# Patient Record
Sex: FEMALE | Race: WHITE | NOT HISPANIC OR LATINO | Employment: OTHER | URBAN - METROPOLITAN AREA
[De-identification: names, ages, dates, MRNs, and addresses within clinical notes are randomized per-mention and may not be internally consistent; named-entity substitution may affect disease eponyms.]

---

## 2017-01-13 RX ORDER — FLUOXETINE HYDROCHLORIDE 20 MG/1
40 CAPSULE ORAL DAILY
Qty: 60 CAPSULE | Refills: 3 | Status: SHIPPED | OUTPATIENT
Start: 2017-01-13 | End: 2017-05-14 | Stop reason: SDUPTHER

## 2017-01-16 RX ORDER — VALSARTAN 40 MG/1
TABLET ORAL
Qty: 30 TABLET | Refills: 4 | Status: SHIPPED | OUTPATIENT
Start: 2017-01-16 | End: 2017-06-19 | Stop reason: SDUPTHER

## 2017-01-23 RX ORDER — LOVASTATIN 40 MG/1
TABLET ORAL
Qty: 90 TABLET | Refills: 3 | Status: SHIPPED | OUTPATIENT
Start: 2017-01-23 | End: 2018-01-28 | Stop reason: SDUPTHER

## 2017-01-30 ENCOUNTER — OFFICE VISIT (OUTPATIENT)
Dept: GASTROENTEROLOGY | Facility: CLINIC | Age: 77
End: 2017-01-30

## 2017-01-30 VITALS
HEIGHT: 62 IN | BODY MASS INDEX: 25.54 KG/M2 | WEIGHT: 138.8 LBS | SYSTOLIC BLOOD PRESSURE: 118 MMHG | DIASTOLIC BLOOD PRESSURE: 76 MMHG

## 2017-01-30 DIAGNOSIS — R13.10 PILL DYSPHAGIA: ICD-10-CM

## 2017-01-30 DIAGNOSIS — R10.32 LEFT LOWER QUADRANT PAIN: Primary | ICD-10-CM

## 2017-01-30 DIAGNOSIS — K57.30 DIVERTICULOSIS OF LARGE INTESTINE WITHOUT HEMORRHAGE: ICD-10-CM

## 2017-01-30 DIAGNOSIS — D12.6 TUBULAR ADENOMA OF COLON: ICD-10-CM

## 2017-01-30 PROCEDURE — 99213 OFFICE O/P EST LOW 20 MIN: CPT | Performed by: NURSE PRACTITIONER

## 2017-01-30 NOTE — MR AVS SNAPSHOT
Coby Roland   1/30/2017 1:00 PM   Office Visit    Dept Phone:  [phone]   Encounter #:  [id number]    Provider:  NATHALIA Whitehead   Department:  [facility] GASTROENTEROLOGY                Your Full Care Plan              Your Updated Medication List          This list is accurate as of: 1/30/17  1:17 PM.  Always use your most recent med list.                ALPRAZolam 0.5 MG tablet   Commonly known as:  XANAX       buPROPion  MG 12 hr tablet   Commonly known as:  WELLBUTRIN SR       dicyclomine 20 MG tablet   Commonly known as:  BENTYL   Take 1 tablet by mouth 4 (Four) Times a Day As Needed (llq pain).       FLUoxetine 20 MG capsule   Commonly known as:  PROzac   Take 2 capsules by mouth Daily.       FLUZONE HIGH-DOSE 0.5 ML suspension prefilled syringe injection   Generic drug:  influenza vac split high-dose       lisinopril 10 MG tablet   Commonly known as:  PRINIVIL,ZESTRIL       lovastatin 40 MG tablet   Commonly known as:  MEVACOR   TAKE ONE TABLET BY MOUTH EVERY NIGHT AT BEDTIME       melatonin 3 MG tablet       SUMAtriptan 100 MG tablet   Commonly known as:  IMITREX   TAKE 1 TABLET BY MOUTH AT ONSET OF MIGRAINE HEADACHE. MAY REPEAT IN 2 HOURS IF NEEDED. DO NOT EXCEED 2 TABLETS IN 24 HOURS       valsartan 40 MG tablet   Commonly known as:  DIOVAN   TAKE ONE TABLET BY MOUTH EVERY NIGHT AT BEDTIME               Instructions     None    Patient Instructions History      Upcoming Appointments     Visit Type Date Time Department    FOLLOW UP 1/30/2017  1:00 PM [facility]    [facility] 2/1/2017  9:20 AM [facility]    SUBSEQUENT MEDICARE WELLNESS 2/8/2017  1:00 PM [facility]    FOLLOW UP 5/5/2017  1:00 PM [facility]      [employer] Signup     Our records indicate that you have an active [employer] account.    You can view your After Visit Summary by going to [email or web address] and logging in with your [employer] username  "and password.  If you don't have a [employer] username and password but a parent or guardian has access to your record, the parent or guardian should login with their own [employer] username and password and access your record to view the After Visit Summary.    If you have questions, you can email [email or web address] or call [phone] to talk to our [employer] staff.  Remember, [employer] is NOT to be used for urgent needs.  For medical emergencies, dial 911.               Other Info from Your Visit           Your Appointments     Feb 01, 2017  9:20 AM EST   [facility] with [facility] VANE MOORE   [facility] (--)    [address] [address]   [phone]            Feb 08, 2017  1:00 PM EST   Subsequent Medicare Wellness with Marie Ramos MD   [facility] (--)    [address] [address]   [phone]            May 05, 2017  1:00 PM EDT   Follow Up with NATHALIA Brown   [facility] (--)    [address] [id number] [phone]           Arrive 15 minutes prior to appointment.              Allergies     Penicillins  Swelling      Reason for Visit     Follow-up from CS     Abdominal Pain           Vital Signs     Blood Pressure Height Weight Last Menstrual Period Body Mass Index Smoking Status    118/76 62\" (157.5 cm) 138 lb 12.8 oz (63 kg) (LMP Unknown) 25.39 kg/m2 Never Smoker        "

## 2017-01-30 NOTE — PROGRESS NOTES
Chief Complaint   Patient presents with   • Follow-up     from     • Abdominal Pain     Subjective     HPI  Coby Roland is a 76 y.o. female who presents for a follow up after colonoscopy and to discuss her LLQ abdominal pain.  She was last seen in the office by Dr. Mary Mac for complaints of left lower quadrant abdominal pain.  The episodes were intermittent and severe- she rated her pain 10/10.  Colonoscopy in December 2016 was performed for further evaluation.  He was unrevealing for etiology of her pain however she did have one TA polyp and diverticulosis in the sigmoid colon.  Since her colonoscopy she has no had no further episodes of severe left lower quadrant pain.  She only has very mild occasional (1-2x in 2 months) discomfort rated a 1/2 out of 10 on the pain scale.  She cannot associate the pain/discomfort with eating, fevers, or change in bowel habits.  The mild left lower quadrant pain is easily relieved by Bentyl.  Her bowels are moving regularly and she does not have any complaints of constipation.   Upon questioning of upper GI symptoms, she reports she has trouble swallowing pills occurring 5-7 times per year.  The pills seem to get stuck in her throat for 1-3 minutes before they pass.  She denies trouble swallowing food or liquids.  She denies odynophagia, nausea, vomiting, chest pain, shortness of breath, hematemesis.  She has never had upper endoscopic evaluation.      Past Medical History   Diagnosis Date   • Acute bronchitis    • Arthritis    • Depression    • Hot flashes      with recent cessation of estrogen therapy   • Hyperlipidemia    • Hyperlipidemia    • Hypertension    • Migraine headache      Past Surgical History   Procedure Laterality Date   • Other surgical history       cataract surgery   • Dilation and curettage, diagnostic / therapeutic     • Hysterectomy     • Colonoscopy  2010     Dr. Michaud   • Colonoscopy N/A 12/13/2016     Procedure: COLONOSCOPY to cecum and TI  with cold polypectomy;  Surgeon: Mary Mac MD;  Location: [facility] ENDOSCOPY;  Service:        Social History     Social History   • Marital status:      Spouse name: N/A   • Number of children: N/A   • Years of education: N/A     Social History Main Topics   • Smoking status: Never Smoker   • Smokeless tobacco: Never Used   • Alcohol use No   • Drug use: No   • Sexual activity: Defer     Other Topics Concern   • None     Social History Narrative         Current Outpatient Prescriptions:   •  ALPRAZolam (XANAX) 0.5 MG tablet, Take 1 tablet by mouth nightly., Disp: , Rfl:   •  buPROPion SR (WELLBUTRIN SR) 200 MG 12 hr tablet, Take 1 tablet by mouth daily., Disp: , Rfl:   •  dicyclomine (BENTYL) 20 MG tablet, Take 1 tablet by mouth 4 (Four) Times a Day As Needed (llq pain)., Disp: 60 tablet, Rfl: 3  •  FLUoxetine (PROzac) 20 MG capsule, Take 2 capsules by mouth Daily., Disp: 60 capsule, Rfl: 3  •  FLUZONE HIGH-DOSE 0.5 ML suspension prefilled syringe injection, , Disp: , Rfl:   •  lisinopril (PRINIVIL,ZESTRIL) 10 MG tablet, Take 10 mg by mouth., Disp: , Rfl:   •  lovastatin (MEVACOR) 40 MG tablet, TAKE ONE TABLET BY MOUTH EVERY NIGHT AT BEDTIME, Disp: 90 tablet, Rfl: 3  •  melatonin tablet, Take by mouth. Take as directed, Disp: , Rfl:   •  SUMAtriptan (IMITREX) 100 MG tablet, TAKE 1 TABLET BY MOUTH AT ONSET OF MIGRAINE HEADACHE. MAY REPEAT IN 2 HOURS IF NEEDED. DO NOT EXCEED 2 TABLETS IN 24 HOURS, Disp: 9 tablet, Rfl: 4  •  valsartan (DIOVAN) 40 MG tablet, TAKE ONE TABLET BY MOUTH EVERY NIGHT AT BEDTIME, Disp: 30 tablet, Rfl: 4    Review of Systems   Constitutional: Negative for appetite change and unexpected weight change.   HENT: Positive for trouble swallowing.    Respiratory: Negative for choking and shortness of breath.    Cardiovascular: Negative for chest pain.   Gastrointestinal: Positive for abdominal pain. Negative for abdominal distention, blood in stool, constipation, diarrhea, nausea and  vomiting.   Musculoskeletal: Negative for back pain.   Skin: Negative for color change.   Neurological: Negative for dizziness.   Hematological: Does not bruise/bleed easily.   Psychiatric/Behavioral: Negative.        Objective   Vitals:    01/30/17 1248   BP: 118/76       Physical Exam   Constitutional: She is oriented to person, place, and time. She appears well-developed and well-nourished.   HENT:   Head: Normocephalic and atraumatic.   Eyes: Pupils are equal, round, and reactive to light.   Cardiovascular: Normal rate, regular rhythm and normal heart sounds.    Pulmonary/Chest: Effort normal and breath sounds normal.   Abdominal: Soft. Bowel sounds are normal. She exhibits no shifting dullness, no distension, no pulsatile liver, no fluid wave, no abdominal bruit, no ascites, no pulsatile midline mass and no mass. There is no hepatosplenomegaly. There is no tenderness. There is no rigidity and no guarding. No hernia.   Musculoskeletal: Normal range of motion.   Neurological: She is alert and oriented to person, place, and time.   Skin: Skin is warm and dry.   Psychiatric: She has a normal mood and affect. Her behavior is normal. Thought content normal.   Nursing note and vitals reviewed.      Assessment/Plan   Coby was seen today for follow-up and abdominal pain.    Diagnoses and all orders for this visit:    Left lower quadrant pain  Comments:  Unclear etiology. Bentyl as needed. Possible symptomatic uncomplicated diverticular dx     Diverticulosis of large intestine without hemorrhage  Comments:  Please start a fiber supplement and probiotic    Tubular adenoma of colon  Comments:  consider repeating surevelliance csope in 5 years b/c in good health. Could decline based on age (75yo) and low risk for colon cancer    Pill dysphagia  Comments:  Rare occurence. Pt doesnt was EGD or treatment. Pt agrees to call office if sx persist/ worsen. We discussed risk factors of untreated dysphagia.     Begin a fiber  supplement.  Benefiber, Citrucel or Metamucil is acceptable.  Fiber gummies are also acceptable.  Please take 1 dose of fiber in the morning and 1 in the evening for 4 weeks and increase to 2 doses of fiber in the morning and 2 in the evening after that. Please  try to maintain a high-fiber diet.  We obtain 10 g of fiber from a high-fiber diet every day.  Women should consume a total of 25 grams of fiber a day, men 30 grams a day.  We can reach the total daily recommended dose of fiber a day utilizing the high-fiber diet and also fiber supplements.Please begin a probiotic.  You can try activia yogurt, align, or florastor.  These can be found over-the-counter at a local grocery store.    Call office immediatly if have recurrence of severe LLQ pain. Call if trouble swallowing worsens or becomes more frequent.   For any additional questions, concerns or changes to your condition after today's office visit please contact the office at [phone].

## 2017-02-01 DIAGNOSIS — Z00.00 HEALTH CARE MAINTENANCE: Primary | ICD-10-CM

## 2017-02-01 DIAGNOSIS — R73.09 BLOOD GLUCOSE ABNORMAL: ICD-10-CM

## 2017-02-01 DIAGNOSIS — I10 ESSENTIAL HYPERTENSION: ICD-10-CM

## 2017-02-01 DIAGNOSIS — E78.2 MIXED HYPERLIPIDEMIA: ICD-10-CM

## 2017-02-03 LAB
ALBUMIN SERPL-MCNC: 4 G/DL (ref 3.5–5.2)
ALBUMIN/GLOB SERPL: 1.9 G/DL
ALP SERPL-CCNC: 64 U/L (ref 39–117)
ALT SERPL-CCNC: 20 U/L (ref 1–33)
APPEARANCE UR: CLEAR
AST SERPL-CCNC: 20 U/L (ref 1–32)
BACTERIA #/AREA URNS HPF: NORMAL /HPF
BACTERIA UR CULT: NORMAL
BACTERIA UR CULT: NORMAL
BASOPHILS # BLD AUTO: 0.02 10*3/MM3 (ref 0–0.2)
BASOPHILS NFR BLD AUTO: 0.4 % (ref 0–1.5)
BILIRUB SERPL-MCNC: 0.5 MG/DL (ref 0.1–1.2)
BILIRUB UR QL STRIP: NEGATIVE
BUN SERPL-MCNC: 21 MG/DL (ref 8–23)
BUN/CREAT SERPL: 24.7 (ref 7–25)
CALCIUM SERPL-MCNC: 9.5 MG/DL (ref 8.6–10.5)
CHLORIDE SERPL-SCNC: 103 MMOL/L (ref 98–107)
CHOLEST SERPL-MCNC: 155 MG/DL (ref 0–200)
CO2 SERPL-SCNC: 27.8 MMOL/L (ref 22–29)
COLOR UR: YELLOW
CREAT SERPL-MCNC: 0.85 MG/DL (ref 0.57–1)
EOSINOPHIL # BLD AUTO: 0.28 10*3/MM3 (ref 0–0.7)
EOSINOPHIL NFR BLD AUTO: 6.1 % (ref 0.3–6.2)
EPI CELLS #/AREA URNS HPF: NORMAL /HPF
ERYTHROCYTE [DISTWIDTH] IN BLOOD BY AUTOMATED COUNT: 13.9 % (ref 11.7–13)
GLOBULIN SER CALC-MCNC: 2.1 GM/DL
GLUCOSE SERPL-MCNC: 86 MG/DL (ref 65–99)
GLUCOSE UR QL: NEGATIVE
HBA1C MFR BLD: 4.91 % (ref 4.8–5.6)
HCT VFR BLD AUTO: 40.2 % (ref 35.6–45.5)
HDLC SERPL-MCNC: 67 MG/DL (ref 40–60)
HGB BLD-MCNC: 13 G/DL (ref 11.9–15.5)
HGB UR QL STRIP: NEGATIVE
IMM GRANULOCYTES # BLD: 0 10*3/MM3 (ref 0–0.03)
IMM GRANULOCYTES NFR BLD: 0 % (ref 0–0.5)
KETONES UR QL STRIP: NEGATIVE
LDLC SERPL CALC-MCNC: 79 MG/DL (ref 0–100)
LEUKOCYTE ESTERASE UR QL STRIP: ABNORMAL
LYMPHOCYTES # BLD AUTO: 1.4 10*3/MM3 (ref 0.9–4.8)
LYMPHOCYTES NFR BLD AUTO: 30.3 % (ref 19.6–45.3)
MCH RBC QN AUTO: 31 PG (ref 26.9–32)
MCHC RBC AUTO-ENTMCNC: 32.3 G/DL (ref 32.4–36.3)
MCV RBC AUTO: 95.9 FL (ref 80.5–98.2)
MICRO URNS: ABNORMAL
MONOCYTES # BLD AUTO: 0.42 10*3/MM3 (ref 0.2–1.2)
MONOCYTES NFR BLD AUTO: 9.1 % (ref 5–12)
MUCOUS THREADS URNS QL MICRO: PRESENT /HPF
NEUTROPHILS # BLD AUTO: 2.5 10*3/MM3 (ref 1.9–8.1)
NEUTROPHILS NFR BLD AUTO: 54.1 % (ref 42.7–76)
NITRITE UR QL STRIP: NEGATIVE
PH UR STRIP: 7 [PH] (ref 5–7.5)
PLATELET # BLD AUTO: 225 10*3/MM3 (ref 140–500)
POTASSIUM SERPL-SCNC: 4.8 MMOL/L (ref 3.5–5.2)
PROT SERPL-MCNC: 6.1 G/DL (ref 6–8.5)
PROT UR QL STRIP: NEGATIVE
RBC # BLD AUTO: 4.19 10*6/MM3 (ref 3.9–5.2)
RBC #/AREA URNS HPF: NORMAL /HPF
SODIUM SERPL-SCNC: 142 MMOL/L (ref 136–145)
SP GR UR: 1.02 (ref 1–1.03)
TRIGL SERPL-MCNC: 46 MG/DL (ref 0–150)
TSH SERPL DL<=0.005 MIU/L-ACNC: 1.78 MIU/ML (ref 0.27–4.2)
URINALYSIS REFLEX: ABNORMAL
UROBILINOGEN UR STRIP-MCNC: 1 MG/DL (ref 0.2–1)
VLDLC SERPL CALC-MCNC: 9.2 MG/DL (ref 5–40)
WBC # BLD AUTO: 4.62 10*3/MM3 (ref 4.5–10.7)
WBC #/AREA URNS HPF: NORMAL /HPF

## 2017-02-08 ENCOUNTER — OFFICE VISIT (OUTPATIENT)
Dept: INTERNAL MEDICINE | Facility: CLINIC | Age: 77
End: 2017-02-08

## 2017-02-08 VITALS
HEIGHT: 62 IN | RESPIRATION RATE: 18 BRPM | BODY MASS INDEX: 25.03 KG/M2 | DIASTOLIC BLOOD PRESSURE: 70 MMHG | SYSTOLIC BLOOD PRESSURE: 130 MMHG | OXYGEN SATURATION: 98 % | HEART RATE: 75 BPM | WEIGHT: 136 LBS

## 2017-02-08 DIAGNOSIS — R41.3 MEMORY LOSS OR IMPAIRMENT: ICD-10-CM

## 2017-02-08 DIAGNOSIS — I10 ESSENTIAL HYPERTENSION: ICD-10-CM

## 2017-02-08 DIAGNOSIS — E78.2 MIXED HYPERLIPIDEMIA: ICD-10-CM

## 2017-02-08 DIAGNOSIS — F32.A ANXIETY AND DEPRESSION: ICD-10-CM

## 2017-02-08 DIAGNOSIS — Z13.820 ENCOUNTER FOR SCREENING FOR OSTEOPOROSIS: ICD-10-CM

## 2017-02-08 DIAGNOSIS — F41.9 ANXIETY AND DEPRESSION: ICD-10-CM

## 2017-02-08 DIAGNOSIS — Z00.00 MEDICARE ANNUAL WELLNESS VISIT, SUBSEQUENT: Primary | ICD-10-CM

## 2017-02-08 DIAGNOSIS — R49.0 HOARSENESS: ICD-10-CM

## 2017-02-08 PROCEDURE — 99214 OFFICE O/P EST MOD 30 MIN: CPT | Performed by: INTERNAL MEDICINE

## 2017-02-08 PROCEDURE — G0439 PPPS, SUBSEQ VISIT: HCPCS | Performed by: INTERNAL MEDICINE

## 2017-02-08 RX ORDER — ALPRAZOLAM 0.5 MG/1
0.5 TABLET ORAL NIGHTLY
Qty: 60 TABLET | Refills: 2 | Status: SHIPPED | OUTPATIENT
Start: 2017-02-08 | End: 2017-02-08 | Stop reason: SDUPTHER

## 2017-02-08 RX ORDER — ALPRAZOLAM 0.5 MG/1
0.5 TABLET ORAL 2 TIMES DAILY
Qty: 60 TABLET | Refills: 2 | Status: SHIPPED | OUTPATIENT
Start: 2017-02-08 | End: 2017-08-07 | Stop reason: SDUPTHER

## 2017-02-08 RX ORDER — MELOXICAM 7.5 MG/1
7.5 TABLET ORAL DAILY
COMMUNITY
End: 2018-09-05

## 2017-02-08 NOTE — PROGRESS NOTES
Medicare Annual Wellness Visit    Chief Complaint:  Annual Exam (SUB AWV); Hypertension; Hyperlipidemia; Anxiety; and Depression      History of Present Illness    Coby Roland is a 76 y.o. female who presents for an Annual Wellness Visit. In addition, we addressed the following health issues:    Mammo:6/13/16  Pap: n/a  DEXA:12/9/14  C-scope:12/13/16 Dr. Mac - polyp removed but future c-scope screening was not recommended.    Flu shot:2016  Prevnar: 2015  Pneumovax: 12/29/15  Eye Exam: August 2016  Exercise: She is not really getting much.      Advanced Care Planning:  has NO advanced directive - not interested in additional information   Immunization History   Administered Date(s) Administered   • Influenza, Unspecified 12/09/2013   • Pneumococcal Conjugate 13-Valent 01/01/2015   • Pneumococcal Polysaccharide 12/29/2015   • Tdap 06/16/2014     Anxiety/depression - She takes xanax every morning and at bedtime.  She has been on this for at least ten years.  The pharmacy said they send a refill request yesterday and it was denied.  We have no record of this.  She feels like her symptoms are well controlled.  After further investigation and review of her [facility] report, Dr. Jonback the psychiatrist she has seen for 30 years had been refilling this.  She has not seen her in about a year because every time she had an appt, the office would call and say she was not going to be in the office that day.  She really does not want to go back to that office at this point.  She has been stable on the current regimen of meds for a long time.      She has had some vocal changes.   It's like her voice goes away.  It's gone on for at least a month - maybe two to three months.   She doesn't have a cough.  She has some occasional difficulty swallowing - maybe twice a month.  She did see Dr. Davis a year ago when she was having issues with her voice and the chronic cough.      She has constant discomfort in her left leg.  She  has a torn tendon but they said there is nothing that can be done for it.  She saw Dr. Baron.  She had a hip injection but this didn't help. They sent in Meloxicam 7.5.  She has been using this as needed.  However, she has been using aleve with it when she takes it.   Advised NOT to do this today.      She feels like her memory is not as good as it used to be.  She has forgotten the names of her grandchildren occasionally.  She has to really think about how to get places when she gets in the car.  She hasn't gotten lost.  She hasn't had any issues remembering how to use the microwave or oven or stove.      HTN - no cp or palp or dizziness or soa.          Review of Systems   Constitutional: Negative for chills, fatigue and fever.   HENT: Positive for hearing loss (she has hearing aids), trouble swallowing (occasionally) and voice change. Negative for sore throat and tinnitus.    Eyes: Negative for visual disturbance.   Respiratory: Negative for cough and shortness of breath.    Cardiovascular: Negative for chest pain, palpitations and leg swelling.   Gastrointestinal: Negative for abdominal distention, abdominal pain, anal bleeding, blood in stool, constipation, diarrhea, nausea and vomiting.   Endocrine: Negative for polydipsia and polyuria.   Genitourinary: Negative for dysuria and hematuria.   Musculoskeletal: Positive for arthralgias (left hip). Negative for myalgias.   Skin: Negative for rash and wound.   Neurological: Negative for dizziness, syncope, light-headedness and headaches.   Hematological: Negative for adenopathy. Does not bruise/bleed easily.   Psychiatric/Behavioral: Positive for confusion (memory issues as noted ) and dysphoric mood. The patient is nervous/anxious.        Past Medical History   Diagnosis Date   • Acute bronchitis    • Arthritis    • Depression    • Hot flashes      with recent cessation of estrogen therapy   • Hyperlipidemia    • Hyperlipidemia    • Hypertension    • Migraine  headache        Past Surgical History   Procedure Laterality Date   • Other surgical history       cataract surgery   • Dilation and curettage, diagnostic / therapeutic     • Hysterectomy     • Colonoscopy  2010     Dr. Michaud   • Colonoscopy N/A 12/13/2016     Procedure: COLONOSCOPY to cecum and TI with cold polypectomy;  Surgeon: Mary Mac MD;  Location: [facility] ENDOSCOPY;  Service:        Social History     Social History   • Marital status:      Spouse name: N/A   • Number of children: N/A   • Years of education: N/A     Occupational History   • Not on file.     Social History Main Topics   • Smoking status: Never Smoker   • Smokeless tobacco: Never Used   • Alcohol use No   • Drug use: No   • Sexual activity: Defer     Other Topics Concern   • Not on file     Social History Narrative       Family History   Problem Relation Age of Onset   • Coronary artery disease Mother    • Other Father      cerebrovascular accident (cva)   • Coronary artery disease Father    • Coronary artery disease Brother    • Diabetes Brother      type 2   • Lung cancer Brother    • Ulcerative colitis Brother    • Breast cancer Daughter        Allergies   Allergen Reactions   • Penicillins Swelling       Current Outpatient Prescriptions on File Prior to Visit   Medication Sig Dispense Refill   • buPROPion SR (WELLBUTRIN SR) 200 MG 12 hr tablet Take 1 tablet by mouth 2 (Two) Times a Day.     • dicyclomine (BENTYL) 20 MG tablet Take 1 tablet by mouth 4 (Four) Times a Day As Needed (llq pain). 60 tablet 3   • FLUoxetine (PROzac) 20 MG capsule Take 2 capsules by mouth Daily. 60 capsule 3   • lovastatin (MEVACOR) 40 MG tablet TAKE ONE TABLET BY MOUTH EVERY NIGHT AT BEDTIME 90 tablet 3   • melatonin tablet Take by mouth. Take as directed     • SUMAtriptan (IMITREX) 100 MG tablet TAKE 1 TABLET BY MOUTH AT ONSET OF MIGRAINE HEADACHE. MAY REPEAT IN 2 HOURS IF NEEDED. DO NOT EXCEED 2 TABLETS IN 24 HOURS 9 tablet 4   • valsartan  "(DIOVAN) 40 MG tablet TAKE ONE TABLET BY MOUTH EVERY NIGHT AT BEDTIME 30 tablet 4   • [DISCONTINUED] ALPRAZolam (XANAX) 0.5 MG tablet Take 1 tablet by mouth nightly.     • [DISCONTINUED] FLUZONE HIGH-DOSE 0.5 ML suspension prefilled syringe injection      • [DISCONTINUED] lisinopril (PRINIVIL,ZESTRIL) 10 MG tablet Take 10 mg by mouth.       No current facility-administered medications on file prior to visit.        Patient Active Problem List   Diagnosis   • Abnormal chest x-ray   • Abnormal electrocardiogram   • Blood glucose abnormal   • Dizziness   • Hearing loss   • Hoarseness   • Hyperlipidemia   • Hypertension   • Knee pain   • Onychomycosis of toenail   • Parkinsonian features   • Hyperhidrosis   • Tremor   • Left thigh pain   • Anxiety and depression   • Left groin pain   • Acute left lower quadrant pain   • Memory loss or impairment       Health Risk Assessment/Depression Screen/Functional and Cognitive Screening:   The patient has completed a Health Risk Assessment & Depression screen. These have been reviewed with them and have been scanned as a separate documents.    Age-appropriate Screening Schedule:  Refer to the list below for future screening recommendations based on patient's age. Orders for these recommended tests are listed in the plan section. The patient has been provided with a written plan.     Vitals:    02/08/17 1250   BP: 130/70   Pulse: 75   Resp: 18   SpO2: 98%   Weight: 136 lb (61.7 kg)   Height: 62\" (157.5 cm)       Body mass index is 24.87 kg/(m^2).    Physical Exam   Constitutional: She is oriented to person, place, and time. She appears well-developed and well-nourished. No distress.   HENT:   Head: Normocephalic and atraumatic.   Nose: Nose normal.   Mouth/Throat: Oropharynx is clear and moist.   Eyes: Conjunctivae and EOM are normal. Pupils are equal, round, and reactive to light. No scleral icterus.   Neck: Normal range of motion. Neck supple. No thyromegaly present. "   Cardiovascular: Normal rate, regular rhythm and normal heart sounds.  Exam reveals no gallop and no friction rub.    No murmur heard.  Pulses:       Carotid pulses are 2+ on the right side, and 2+ on the left side.       Femoral pulses are 2+ on the right side, and 2+ on the left side.       Dorsalis pedis pulses are 2+ on the right side, and 2+ on the left side.        Posterior tibial pulses are 2+ on the right side, and 2+ on the left side.   Pulmonary/Chest: Effort normal and breath sounds normal. No respiratory distress. She has no wheezes. She has no rales. Right breast exhibits no mass and no nipple discharge. Left breast exhibits no mass and no nipple discharge.   Abdominal: Soft. Bowel sounds are normal. She exhibits no distension and no mass. There is no tenderness.   Musculoskeletal: Normal range of motion. She exhibits no edema.       Vascular Status -  Her exam exhibits no right foot edema. Her exam exhibits no left foot edema.   Skin Integrity  -  Her right foot skin is intact.     Coby 's left foot skin is intact. .  Lymphadenopathy:     She has no cervical adenopathy.     She has no axillary adenopathy.        Right: No inguinal and no supraclavicular adenopathy present.        Left: No inguinal and no supraclavicular adenopathy present.   Neurological: She is alert and oriented to person, place, and time. She has normal reflexes. No cranial nerve deficit.   Skin: Skin is warm and dry.   Psychiatric: She has a normal mood and affect. Her speech is normal and behavior is normal. Judgment and thought content normal. Cognition and memory are normal.   Vitals reviewed.      Results for orders placed or performed in visit on 02/01/17   Comprehensive Metabolic Panel   Result Value Ref Range    Glucose 86 65 - 99 mg/dL    BUN 21 8 - 23 mg/dL    Creatinine 0.85 0.57 - 1.00 mg/dL    eGFR Non African Am 65 >60 mL/min/1.73    eGFR African Am 79 >60 mL/min/1.73    BUN/Creatinine Ratio 24.7 7.0 - 25.0     Sodium 142 136 - 145 mmol/L    Potassium 4.8 3.5 - 5.2 mmol/L    Chloride 103 98 - 107 mmol/L    Total CO2 27.8 22.0 - 29.0 mmol/L    Calcium 9.5 8.6 - 10.5 mg/dL    Total Protein 6.1 6.0 - 8.5 g/dL    Albumin 4.00 3.50 - 5.20 g/dL    Globulin 2.1 gm/dL    A/G Ratio 1.9 g/dL    Total Bilirubin 0.5 0.1 - 1.2 mg/dL    Alkaline Phosphatase 64 39 - 117 U/L    AST (SGOT) 20 1 - 32 U/L    ALT (SGPT) 20 1 - 33 U/L   Lipid Panel   Result Value Ref Range    Total Cholesterol 155 0 - 200 mg/dL    Triglycerides 46 0 - 150 mg/dL    HDL Cholesterol 67 (H) 40 - 60 mg/dL    VLDL Cholesterol 9.2 5 - 40 mg/dL    LDL Cholesterol  79 0 - 100 mg/dL   TSH Rfx On Abnormal To Free T4   Result Value Ref Range    TSH 1.78 0.27 - 4.2 mIU/mL   UA / M With / Rflx Culture([facility] ONLY)   Result Value Ref Range    Specific Gravity, UA 1.025 1.005 - 1.030    pH, UA 7.0 5.0 - 7.5    Color, UA Yellow Yellow    Appearance, UA Clear Clear    Leukocytes, UA 1+ (A) Negative    Protein Negative Negative/Trace    Glucose, UA Negative Negative    Ketones Negative Negative    Blood, UA Negative Negative    Bilirubin, UA Negative Negative    Urobilinogen, UA 1.0 0.2 - 1.0 mg/dL    Nitrite, UA Negative Negative    Microscopic Examination See below:     Urinalysis Reflex Comment    Hemoglobin A1c   Result Value Ref Range    Hemoglobin A1C 4.91 4.80 - 5.60 %   Microscopic Examination   Result Value Ref Range    WBC, UA 0-5 0 - 5 /hpf    RBC, UA 0-2 0 - 2 /hpf    Epithelial Cells (non renal) 0-10 0 - 10 /hpf    Mucus, UA Present Not Estab. /hpf    Bacteria, UA None seen None seen/Few /hpf   Urine Culture, Routine   Result Value Ref Range    Urine Culture Final report     Result 1 Comment    CBC & Differential   Result Value Ref Range    WBC 4.62 4.50 - 10.70 10*3/mm3    RBC 4.19 3.90 - 5.20 10*6/mm3    Hemoglobin 13.0 11.9 - 15.5 g/dL    Hematocrit 40.2 35.6 - 45.5 %    MCV 95.9 80.5 - 98.2 fL    MCH 31.0 26.9 - 32.0 pg    MCHC 32.3 (L) 32.4 - 36.3 g/dL    RDW  13.9 (H) 11.7 - 13.0 %    Platelets 225 140 - 500 10*3/mm3    Neutrophil Rel % 54.1 42.7 - 76.0 %    Lymphocyte Rel % 30.3 19.6 - 45.3 %    Monocyte Rel % 9.1 5.0 - 12.0 %    Eosinophil Rel % 6.1 0.3 - 6.2 %    Basophil Rel % 0.4 0.0 - 1.5 %    Neutrophils Absolute 2.50 1.90 - 8.10 10*3/mm3    Lymphocytes Absolute 1.40 0.90 - 4.80 10*3/mm3    Monocytes Absolute 0.42 0.20 - 1.20 10*3/mm3    Eosinophils Absolute 0.28 0.00 - 0.70 10*3/mm3    Basophils Absolute 0.02 0.00 - 0.20 10*3/mm3    Immature Granulocyte Rel % 0.0 0.0 - 0.5 %    Immature Grans Absolute 0.00 0.00 - 0.03 10*3/mm3       Assessment & Plan:    Diagnoses and all orders for this visit:    Medicare annual wellness visit, subsequent    Mixed hyperlipidemia    Essential hypertension    Hoarseness  -     esomeprazole (NEXIUM) 20 MG capsule; Take 1 capsule by mouth Every Morning Before Breakfast.    Anxiety and depression    Memory loss or impairment  -     Ambulatory Referral to Neuropsychology    Encounter for screening for osteoporosis  -     DEXA Bone Density Axial    Other orders  -     meloxicam (MOBIC) 7.5 MG tablet; Take 7.5 mg by mouth Daily.  -     Discontinue: ALPRAZolam (XANAX) 0.5 MG tablet; Take 1 tablet by mouth Every Night.  -     ALPRAZolam (XANAX) 0.5 MG tablet; Take 1 tablet by mouth 2 (Two) Times a Day.        Discussion/Summary  Coby is here for her AWV and follow up.  She is almost up to date on her health maintenance.  We were going to do her DEXA today but we don't have an LMR in the office right now.  We will do this in two months when I see her back.  For her hoarseness, I would like her to take Nexium daily to see if this helps.  I am going to see her back in two months to reassess.  Her bp is well controlled.  Her lipids look very good.  She has seen Dr. Davis a year ago for vocal changes and couldn't find anything wrong.  Her symptoms eventually resolved but seem to have returned in the last three months.  If her symptoms do  not improve on Nexium after two months, I would like for him to reassess her vocal cords.  For her Anxiety and depression, she had seen Dr. Duvall for she says 30 years.  She does not feel the need to go back there.  I have agreed to take over her meds.  I would like for her to decrease her Xanax to 0.25 in the morning to see if this helps with memory.  We discussed the effect of benzos at her age.  I am going to see her back in two months to reassess.  Controlled Sub Agreement Signed.  I would like to have her do formal neuropsych testing for her memory.  We will reassess in 2 mo.        Follow Up:  No Follow-up on file.     An After Visit Summary and PPPS with all of these plans were given to the patient.

## 2017-02-21 RX ORDER — BUPROPION HYDROCHLORIDE 200 MG/1
200 TABLET, EXTENDED RELEASE ORAL 2 TIMES DAILY
Qty: 60 TABLET | Refills: 6 | Status: SHIPPED | OUTPATIENT
Start: 2017-02-21 | End: 2017-09-24 | Stop reason: SDUPTHER

## 2017-04-12 ENCOUNTER — OFFICE VISIT (OUTPATIENT)
Dept: INTERNAL MEDICINE | Facility: CLINIC | Age: 77
End: 2017-04-12

## 2017-04-12 VITALS
OXYGEN SATURATION: 99 % | WEIGHT: 133 LBS | HEIGHT: 62 IN | DIASTOLIC BLOOD PRESSURE: 64 MMHG | RESPIRATION RATE: 18 BRPM | SYSTOLIC BLOOD PRESSURE: 122 MMHG | BODY MASS INDEX: 24.48 KG/M2 | HEART RATE: 88 BPM

## 2017-04-12 DIAGNOSIS — F32.A ANXIETY AND DEPRESSION: ICD-10-CM

## 2017-04-12 DIAGNOSIS — R49.0 HOARSENESS: ICD-10-CM

## 2017-04-12 DIAGNOSIS — R07.89 CHEST PAIN, ATYPICAL: ICD-10-CM

## 2017-04-12 DIAGNOSIS — E78.2 MIXED HYPERLIPIDEMIA: ICD-10-CM

## 2017-04-12 DIAGNOSIS — R42 EPISODIC LIGHTHEADEDNESS: Primary | ICD-10-CM

## 2017-04-12 DIAGNOSIS — Z78.0 POSTMENOPAUSAL: ICD-10-CM

## 2017-04-12 DIAGNOSIS — I10 ESSENTIAL HYPERTENSION: ICD-10-CM

## 2017-04-12 DIAGNOSIS — F41.9 ANXIETY AND DEPRESSION: ICD-10-CM

## 2017-04-12 PROCEDURE — 77080 DXA BONE DENSITY AXIAL: CPT | Performed by: INTERNAL MEDICINE

## 2017-04-12 PROCEDURE — 99214 OFFICE O/P EST MOD 30 MIN: CPT | Performed by: INTERNAL MEDICINE

## 2017-04-12 NOTE — PROGRESS NOTES
"Chief Complaint  Coby Roland is a 76 y.o. female who presents for Med Management (Added nexium at last visit, decreased Xanax dosage to help with memory loss. ); Follow-up (DEXA prior. 2 month); and Dizziness  .    History of Present Illness   Coby is here for follow up on her memory and her anxiety and her voice.  She is only taking one Xanax at night.  She was able to get rid of the xanax during the day.  Her memory is definitely better.  She would like to try to decrease or even stop the xanax dose at night as well.  We discussed starting with cutting the pill in half.      Her voice did not improve with the nexium at all.  She still feels like her voice is hoarse.  She has not seen ENT yet.      About two weeks ago she got lightheaded after walking up ten steps.  No SOA.   She had some diarrhea within ten minutes. She hadn't had any belly issues while she was working outside.   She had been working in the yard weeding prior to all of this happening.  The weeds were in a raised bed so she wasn't squatting prior to this happening.  It was a strange lightheadedness.  She hadn't had anything like this before.  Three or four days later she had some electric shock type pain in her chest that came and went but it went on for 15 minutes that woke her up from her sleep.  She hasn't had anything like that since.  She didn't have any other symptoms associated with the \"shocks\".        Review of Systems   HENT: Positive for voice change. Negative for trouble swallowing.    Cardiovascular: Positive for chest pain. Negative for palpitations and leg swelling.   Gastrointestinal: Negative for abdominal pain.   Neurological: Positive for light-headedness. Negative for dizziness, speech difficulty, weakness and headaches.   Psychiatric/Behavioral: Negative for confusion and decreased concentration.       Patient Active Problem List   Diagnosis   • Abnormal chest x-ray   • Abnormal electrocardiogram   • Blood glucose " abnormal   • Dizziness   • Hearing loss   • Hoarseness   • Hyperlipidemia   • Hypertension   • Knee pain   • Onychomycosis of toenail   • Parkinsonian features   • Hyperhidrosis   • Tremor   • Left thigh pain   • Anxiety and depression   • Left groin pain   • Memory loss or impairment   • Episodic lightheadedness       Past Medical History:   Diagnosis Date   • Arthritis    • Depression    • Hot flashes     with recent cessation of estrogen therapy   • Hyperlipidemia    • Hyperlipidemia    • Hypertension    • Migraine headache        Past Surgical History:   Procedure Laterality Date   • COLONOSCOPY  2010    Dr. Michaud   • COLONOSCOPY N/A 12/13/2016    Procedure: COLONOSCOPY to cecum and TI with cold polypectomy;  Surgeon: Mary Mac MD;  Location: [facility] ENDOSCOPY;  Service:    • DILATION AND CURETTAGE, DIAGNOSTIC / THERAPEUTIC     • HYSTERECTOMY     • OTHER SURGICAL HISTORY      cataract surgery       Family History   Problem Relation Age of Onset   • Coronary artery disease Mother    • Other Father      cerebrovascular accident (cva)   • Coronary artery disease Father    • Coronary artery disease Brother    • Diabetes Brother      type 2   • Lung cancer Brother    • Ulcerative colitis Brother    • Breast cancer Daughter        Social History     Social History   • Marital status:      Spouse name: N/A   • Number of children: N/A   • Years of education: N/A     Occupational History   • Not on file.     Social History Main Topics   • Smoking status: Never Smoker   • Smokeless tobacco: Never Used   • Alcohol use No   • Drug use: No   • Sexual activity: Defer     Other Topics Concern   • Not on file     Social History Narrative       Current Outpatient Prescriptions on File Prior to Visit   Medication Sig Dispense Refill   • ALPRAZolam (XANAX) 0.5 MG tablet Take 1 tablet by mouth 2 (Two) Times a Day. 60 tablet 2   • buPROPion SR (WELLBUTRIN SR) 200 MG 12 hr tablet Take 1 tablet by mouth 2 (Two) Times a  "Day. 60 tablet 6   • dicyclomine (BENTYL) 20 MG tablet Take 1 tablet by mouth 4 (Four) Times a Day As Needed (llq pain). 60 tablet 3   • esomeprazole (NEXIUM) 20 MG capsule Take 1 capsule by mouth Every Morning Before Breakfast. 30 capsule    • FLUoxetine (PROzac) 20 MG capsule Take 2 capsules by mouth Daily. 60 capsule 3   • lovastatin (MEVACOR) 40 MG tablet TAKE ONE TABLET BY MOUTH EVERY NIGHT AT BEDTIME 90 tablet 3   • melatonin tablet Take by mouth. Take as directed     • meloxicam (MOBIC) 7.5 MG tablet Take 7.5 mg by mouth Daily.     • SUMAtriptan (IMITREX) 100 MG tablet TAKE 1 TABLET BY MOUTH AT ONSET OF MIGRAINE HEADACHE. MAY REPEAT IN 2 HOURS IF NEEDED. DO NOT EXCEED 2 TABLETS IN 24 HOURS 9 tablet 4   • valsartan (DIOVAN) 40 MG tablet TAKE ONE TABLET BY MOUTH EVERY NIGHT AT BEDTIME 30 tablet 4     No current facility-administered medications on file prior to visit.        Allergies   Allergen Reactions   • Penicillins Swelling       Vitals:    04/12/17 1133   BP: 122/64   Pulse: 88   Resp: 18   SpO2: 99%   Weight: 133 lb (60.3 kg)   Height: 62\" (157.5 cm)       Body mass index is 24.33 kg/(m^2).    Objective   Physical Exam   Constitutional: She is oriented to person, place, and time. She appears well-nourished. No distress.   HENT:   Head: Normocephalic and atraumatic.   Eyes: Conjunctivae are normal. No scleral icterus.   Neck: Normal range of motion. Neck supple.   Cardiovascular: Normal rate, regular rhythm and normal heart sounds.  Exam reveals no gallop and no friction rub.    No murmur heard.  Pulmonary/Chest: Effort normal and breath sounds normal. No respiratory distress. She has no wheezes. She has no rales.   Musculoskeletal: She exhibits no edema.   Lymphadenopathy:     She has no cervical adenopathy.   Neurological: She is alert and oriented to person, place, and time.   Psychiatric: She has a normal mood and affect. Her behavior is normal. Judgment and thought content normal.       Results for " orders placed or performed in visit on 02/01/17   Comprehensive Metabolic Panel   Result Value Ref Range    Glucose 86 65 - 99 mg/dL    BUN 21 8 - 23 mg/dL    Creatinine 0.85 0.57 - 1.00 mg/dL    eGFR Non African Am 65 >60 mL/min/1.73    eGFR African Am 79 >60 mL/min/1.73    BUN/Creatinine Ratio 24.7 7.0 - 25.0    Sodium 142 136 - 145 mmol/L    Potassium 4.8 3.5 - 5.2 mmol/L    Chloride 103 98 - 107 mmol/L    Total CO2 27.8 22.0 - 29.0 mmol/L    Calcium 9.5 8.6 - 10.5 mg/dL    Total Protein 6.1 6.0 - 8.5 g/dL    Albumin 4.00 3.50 - 5.20 g/dL    Globulin 2.1 gm/dL    A/G Ratio 1.9 g/dL    Total Bilirubin 0.5 0.1 - 1.2 mg/dL    Alkaline Phosphatase 64 39 - 117 U/L    AST (SGOT) 20 1 - 32 U/L    ALT (SGPT) 20 1 - 33 U/L   Lipid Panel   Result Value Ref Range    Total Cholesterol 155 0 - 200 mg/dL    Triglycerides 46 0 - 150 mg/dL    HDL Cholesterol 67 (H) 40 - 60 mg/dL    VLDL Cholesterol 9.2 5 - 40 mg/dL    LDL Cholesterol  79 0 - 100 mg/dL   TSH Rfx On Abnormal To Free T4   Result Value Ref Range    TSH 1.78 0.27 - 4.2 mIU/mL   UA / M With / Rflx Culture([facility] ONLY)   Result Value Ref Range    Specific Gravity, UA 1.025 1.005 - 1.030    pH, UA 7.0 5.0 - 7.5    Color, UA Yellow Yellow    Appearance, UA Clear Clear    Leukocytes, UA 1+ (A) Negative    Protein Negative Negative/Trace    Glucose, UA Negative Negative    Ketones Negative Negative    Blood, UA Negative Negative    Bilirubin, UA Negative Negative    Urobilinogen, UA 1.0 0.2 - 1.0 mg/dL    Nitrite, UA Negative Negative    Microscopic Examination See below:     Urinalysis Reflex Comment    Hemoglobin A1c   Result Value Ref Range    Hemoglobin A1C 4.91 4.80 - 5.60 %   Microscopic Examination   Result Value Ref Range    WBC, UA 0-5 0 - 5 /hpf    RBC, UA 0-2 0 - 2 /hpf    Epithelial Cells (non renal) 0-10 0 - 10 /hpf    Mucus, UA Present Not Estab. /hpf    Bacteria, UA None seen None seen/Few /hpf   Urine Culture, Routine   Result Value Ref Range    Urine  Culture Final report     Result 1 Comment    CBC & Differential   Result Value Ref Range    WBC 4.62 4.50 - 10.70 10*3/mm3    RBC 4.19 3.90 - 5.20 10*6/mm3    Hemoglobin 13.0 11.9 - 15.5 g/dL    Hematocrit 40.2 35.6 - 45.5 %    MCV 95.9 80.5 - 98.2 fL    MCH 31.0 26.9 - 32.0 pg    MCHC 32.3 (L) 32.4 - 36.3 g/dL    RDW 13.9 (H) 11.7 - 13.0 %    Platelets 225 140 - 500 10*3/mm3    Neutrophil Rel % 54.1 42.7 - 76.0 %    Lymphocyte Rel % 30.3 19.6 - 45.3 %    Monocyte Rel % 9.1 5.0 - 12.0 %    Eosinophil Rel % 6.1 0.3 - 6.2 %    Basophil Rel % 0.4 0.0 - 1.5 %    Neutrophils Absolute 2.50 1.90 - 8.10 10*3/mm3    Lymphocytes Absolute 1.40 0.90 - 4.80 10*3/mm3    Monocytes Absolute 0.42 0.20 - 1.20 10*3/mm3    Eosinophils Absolute 0.28 0.00 - 0.70 10*3/mm3    Basophils Absolute 0.02 0.00 - 0.20 10*3/mm3    Immature Granulocyte Rel % 0.0 0.0 - 0.5 %    Immature Grans Absolute 0.00 0.00 - 0.03 10*3/mm3       Assessment/Plan   Diagnoses and all orders for this visit:    Episodic lightheadedness  -     Treadmill Stress Test; Future    Postmenopausal  -     DEXA Bone Density Axial    Hoarseness  -     Ambulatory Referral to ENT (Otolaryngology)    Mixed hyperlipidemia  -     Treadmill Stress Test; Future    Essential hypertension  -     Treadmill Stress Test; Future    Anxiety and depression    Chest pain, atypical  -     Treadmill Stress Test; Future        Discussion/Summary  Coby is here for follow up but also has a new issue.  First of all, her hoarseness has not improved on daily nexium.  I have put in a referral to Dr. Davis for further eval.  Her memory has improved since stopping the xanax dose during the day.  She is going to see if she can decrease the dose in the evening to a half a pill.   She is really pleased with the improvement in her memory as am I.  Thankfully, her anxiety has remained well controlled.  She experienced a new lightheadedness and atypical chest pain a few weeks ago that warrants further  evaluation.  I have put in an order for a stress test.  We reviewed her DEXA together which is completely normal.  It is unlikely she will need any further DEXA scans.  Continue current meds as noted.  I will see her back in four months for her follow up.  Will get labs at that time:  CMP  Return in about 4 months (around 8/12/2017) for Next scheduled follow up, with nonfasting labs prior.

## 2017-04-28 ENCOUNTER — OFFICE VISIT (OUTPATIENT)
Dept: ORTHOPEDIC SURGERY | Facility: CLINIC | Age: 77
End: 2017-04-28

## 2017-04-28 VITALS — BODY MASS INDEX: 24.66 KG/M2 | TEMPERATURE: 98 F | HEIGHT: 62 IN | WEIGHT: 134 LBS

## 2017-04-28 DIAGNOSIS — M25.552 LEFT HIP PAIN: Primary | ICD-10-CM

## 2017-04-28 PROCEDURE — 99213 OFFICE O/P EST LOW 20 MIN: CPT | Performed by: NURSE PRACTITIONER

## 2017-04-28 NOTE — PROGRESS NOTES
"Patient: Coby Roland  YOB: 1940 76 y.o. female  Medical Record Number: [id number]    Chief Complaints:   Chief Complaint   Patient presents with   • Left Hip - Follow-up, Pain       History of Present Illness:Coby Roland is a 76 y.o. female who presents For follow-up of left hip pain.  She does have a known labral tear.  She did have a fluoroscopy guided cortisone injection which helped considerably, and in fact is still helping quite a bit.  At this point she does have some mild pain going up and down stairs and feels as though at times her leg\" is a little weak\" but otherwise she feels as though her pain is well-controlled.  She does take Aleve as needed instead of the meloxicam that she was prescribed.    Allergies:   Allergies   Allergen Reactions   • Penicillins Swelling       Medications:   Current Outpatient Prescriptions   Medication Sig Dispense Refill   • ALPRAZolam (XANAX) 0.5 MG tablet Take 1 tablet by mouth 2 (Two) Times a Day. 60 tablet 2   • buPROPion SR (WELLBUTRIN SR) 200 MG 12 hr tablet Take 1 tablet by mouth 2 (Two) Times a Day. 60 tablet 6   • dicyclomine (BENTYL) 20 MG tablet Take 1 tablet by mouth 4 (Four) Times a Day As Needed (llq pain). 60 tablet 3   • FLUoxetine (PROzac) 20 MG capsule Take 2 capsules by mouth Daily. 60 capsule 3   • lovastatin (MEVACOR) 40 MG tablet TAKE ONE TABLET BY MOUTH EVERY NIGHT AT BEDTIME 90 tablet 3   • melatonin tablet Take by mouth. Take as directed     • SUMAtriptan (IMITREX) 100 MG tablet TAKE 1 TABLET BY MOUTH AT ONSET OF MIGRAINE HEADACHE. MAY REPEAT IN 2 HOURS IF NEEDED. DO NOT EXCEED 2 TABLETS IN 24 HOURS 9 tablet 4   • esomeprazole (NEXIUM) 20 MG capsule Take 1 capsule by mouth Every Morning Before Breakfast. 30 capsule    • meloxicam (MOBIC) 7.5 MG tablet Take 7.5 mg by mouth Daily.     • valsartan (DIOVAN) 40 MG tablet TAKE ONE TABLET BY MOUTH EVERY NIGHT AT BEDTIME 30 tablet 4     No current facility-administered medications for " "this visit.          The following portions of the patient's history were reviewed and updated as appropriate: allergies, current medications, past family history, past medical history, past social history, past surgical history and problem list.    Review of Systems:   A 14 point review of systems was performed. All systems negative except pertinent positives/negative listed in HPI above    Physical Exam:   Vitals:    04/28/17 1338   Temp: 98 °F (36.7 °C)   TempSrc: Temporal Artery    Weight: 134 lb (60.8 kg)   Height: 62\" (157.5 cm)       General: A and O x 3, ASA, NAD    SCLERA:    Normal    DENTITION:   NormalSkin clear no unusual lesions noted  Left hip patient is nontender palpation she has normal hip exam with a negative Stinchfield negative logroll calf is soft and nontender    Assessment/Plan:  Left hip pain secondary to labral tear    Patient was given an order for outpatient physical therapy.  Hopefully by doing some quad strengthening and generalized hip stretching but might help some with the weakness that she is experiencing.  She'll continue with Aleve as needed.  I did offer another cortisone injection since it's been several months patient like to hold off.  Otherwise we'll see her back in 6 months she will call the meantime if her symptoms worsen we will repeat x-rays at her follow-up appointment  "

## 2017-05-02 ENCOUNTER — HOSPITAL ENCOUNTER (OUTPATIENT)
Dept: CARDIOLOGY | Facility: HOSPITAL | Age: 77
Discharge: HOME | End: 2017-05-02
Admitting: INTERNAL MEDICINE

## 2017-05-02 DIAGNOSIS — R07.89 CHEST PAIN, ATYPICAL: ICD-10-CM

## 2017-05-02 DIAGNOSIS — E78.2 MIXED HYPERLIPIDEMIA: ICD-10-CM

## 2017-05-02 DIAGNOSIS — R42 EPISODIC LIGHTHEADEDNESS: ICD-10-CM

## 2017-05-02 DIAGNOSIS — I10 ESSENTIAL HYPERTENSION: ICD-10-CM

## 2017-05-02 LAB
BH CV STRESS BP STAGE 1: NORMAL
BH CV STRESS BP STAGE 2: NORMAL
BH CV STRESS BP STAGE 3: NORMAL
BH CV STRESS DURATION MIN STAGE 1: 3
BH CV STRESS DURATION MIN STAGE 2: 3
BH CV STRESS DURATION SEC STAGE 1: 0
BH CV STRESS DURATION SEC STAGE 2: 0
BH CV STRESS DURATION SEC STAGE 3: 42
BH CV STRESS GRADE STAGE 1: 10
BH CV STRESS GRADE STAGE 2: 12
BH CV STRESS GRADE STAGE 3: 14
BH CV STRESS HR STAGE 1: 111
BH CV STRESS HR STAGE 2: 118
BH CV STRESS HR STAGE 3: 123
BH CV STRESS METS STAGE 1: 5
BH CV STRESS METS STAGE 2: 7.5
BH CV STRESS METS STAGE 3: 10
BH CV STRESS PROTOCOL 1: NORMAL
BH CV STRESS RECOVERY BP: NORMAL MMHG
BH CV STRESS RECOVERY HR: 88 BPM
BH CV STRESS SPEED STAGE 1: 1.7
BH CV STRESS SPEED STAGE 2: 2.5
BH CV STRESS SPEED STAGE 3: 3.4
BH CV STRESS STAGE 1: 1
BH CV STRESS STAGE 2: 2
BH CV STRESS STAGE 3: 3
MAXIMAL PREDICTED HEART RATE: 145 BPM
PERCENT MAX PREDICTED HR: 84.83 %
STRESS BASELINE BP: NORMAL MMHG
STRESS BASELINE HR: 84 BPM
STRESS PERCENT HR: 100 %
STRESS POST ESTIMATED WORKLOAD: 8 METS
STRESS POST EXERCISE DUR MIN: 6 MIN
STRESS POST EXERCISE DUR SEC: 42 SEC
STRESS POST PEAK BP: NORMAL MMHG
STRESS POST PEAK HR: 123 BPM
STRESS TARGET HR: 123 BPM

## 2017-05-02 PROCEDURE — 93018 CV STRESS TEST I&R ONLY: CPT | Performed by: INTERNAL MEDICINE

## 2017-05-02 PROCEDURE — 93016 CV STRESS TEST SUPVJ ONLY: CPT | Performed by: INTERNAL MEDICINE

## 2017-05-02 PROCEDURE — 93017 CV STRESS TEST TRACING ONLY: CPT

## 2017-05-05 ENCOUNTER — TREATMENT (OUTPATIENT)
Dept: PHYSICAL THERAPY | Facility: CLINIC | Age: 77
End: 2017-05-05

## 2017-05-05 DIAGNOSIS — M25.552 LEFT HIP PAIN: Primary | ICD-10-CM

## 2017-05-05 PROCEDURE — 97161 PT EVAL LOW COMPLEX 20 MIN: CPT | Performed by: PHYSICAL THERAPIST

## 2017-05-05 PROCEDURE — 97110 THERAPEUTIC EXERCISES: CPT | Performed by: PHYSICAL THERAPIST

## 2017-05-10 ENCOUNTER — TREATMENT (OUTPATIENT)
Dept: PHYSICAL THERAPY | Facility: CLINIC | Age: 77
End: 2017-05-10

## 2017-05-10 DIAGNOSIS — M25.552 LEFT HIP PAIN: Primary | ICD-10-CM

## 2017-05-10 PROCEDURE — 97110 THERAPEUTIC EXERCISES: CPT | Performed by: PHYSICAL THERAPIST

## 2017-05-10 PROCEDURE — 97140 MANUAL THERAPY 1/> REGIONS: CPT | Performed by: PHYSICAL THERAPIST

## 2017-05-15 RX ORDER — FLUOXETINE HYDROCHLORIDE 20 MG/1
CAPSULE ORAL
Qty: 60 CAPSULE | Refills: 2 | Status: SHIPPED | OUTPATIENT
Start: 2017-05-15 | End: 2017-08-21 | Stop reason: SDUPTHER

## 2017-05-17 ENCOUNTER — TREATMENT (OUTPATIENT)
Dept: PHYSICAL THERAPY | Facility: CLINIC | Age: 77
End: 2017-05-17

## 2017-05-17 DIAGNOSIS — M25.552 LEFT HIP PAIN: Primary | ICD-10-CM

## 2017-05-17 PROCEDURE — 97140 MANUAL THERAPY 1/> REGIONS: CPT | Performed by: PHYSICAL THERAPIST

## 2017-05-17 PROCEDURE — 97110 THERAPEUTIC EXERCISES: CPT | Performed by: PHYSICAL THERAPIST

## 2017-05-24 ENCOUNTER — TREATMENT (OUTPATIENT)
Dept: PHYSICAL THERAPY | Facility: CLINIC | Age: 77
End: 2017-05-24

## 2017-05-24 DIAGNOSIS — M25.552 LEFT HIP PAIN: Primary | ICD-10-CM

## 2017-05-24 PROCEDURE — 97112 NEUROMUSCULAR REEDUCATION: CPT | Performed by: PHYSICAL THERAPIST

## 2017-05-24 PROCEDURE — 97110 THERAPEUTIC EXERCISES: CPT | Performed by: PHYSICAL THERAPIST

## 2017-06-05 ENCOUNTER — OFFICE VISIT (OUTPATIENT)
Dept: INTERNAL MEDICINE | Facility: CLINIC | Age: 77
End: 2017-06-05

## 2017-06-05 ENCOUNTER — TREATMENT (OUTPATIENT)
Dept: PHYSICAL THERAPY | Facility: CLINIC | Age: 77
End: 2017-06-05

## 2017-06-05 VITALS
BODY MASS INDEX: 23.92 KG/M2 | HEIGHT: 62 IN | RESPIRATION RATE: 18 BRPM | OXYGEN SATURATION: 98 % | HEART RATE: 74 BPM | SYSTOLIC BLOOD PRESSURE: 118 MMHG | DIASTOLIC BLOOD PRESSURE: 62 MMHG | WEIGHT: 130 LBS

## 2017-06-05 DIAGNOSIS — G56.03 BILATERAL CARPAL TUNNEL SYNDROME: Primary | ICD-10-CM

## 2017-06-05 DIAGNOSIS — M25.552 LEFT HIP PAIN: Primary | ICD-10-CM

## 2017-06-05 PROCEDURE — 97112 NEUROMUSCULAR REEDUCATION: CPT | Performed by: PHYSICAL THERAPIST

## 2017-06-05 PROCEDURE — 99213 OFFICE O/P EST LOW 20 MIN: CPT | Performed by: INTERNAL MEDICINE

## 2017-06-05 PROCEDURE — 97110 THERAPEUTIC EXERCISES: CPT | Performed by: PHYSICAL THERAPIST

## 2017-06-05 NOTE — PROGRESS NOTES
Physical Therapy Daily Progress Note  Visit: 5    Coby Roland reports: My hip is feeling better, but my hands are going numb. I am going to MD today about my hands    Subjective     Objective   See Exercise, Manual, and Modality Logs for complete treatment.       Assessment & Plan     Assessment  Assessment details: Pt tolerated treatment well. Definitely improving with balance and left hip strength    Plan  Plan details: Progress per POC        Manual Therapy:    2     mins  61087;  Therapeutic Exercise:    30     mins  03335;     Neuromuscular Yrn:    10    mins  46674;    Therapeutic Activity:     -     mins  62729;     Gait Training:      -     mins  78247;     Ultrasound:     -     mins  48499;    Electrical Stimulation:    -     mins  73183 ( );  Dry Needling     -     mins self-pay    Timed Treatment:   42   mins   Total Treatment:     50   mins    Soniya Sands PT  [place] License #: [id number]    Physical Therapist

## 2017-06-05 NOTE — PATIENT INSTRUCTIONS
Carpal Tunnel Syndrome  Carpal tunnel syndrome is a condition that causes pain in your hand and arm. The carpal tunnel is a narrow area located on the palm side of your wrist. Repeated wrist motion or certain diseases may cause swelling within the tunnel. This swelling pinches the main nerve in the wrist (median nerve).  CAUSES   This condition may be caused by:   · Repeated wrist motions.  · Wrist injuries.  · Arthritis.  · A cyst or tumor in the carpal tunnel.  · Fluid buildup during pregnancy.  Sometimes the cause of this condition is not known.   RISK FACTORS  This condition is more likely to develop in:   · People who have jobs that cause them to repeatedly move their wrists in the same motion, such as butchers and cashiers.  · Women.  · People with certain conditions, such as:    Diabetes.    Obesity.    An underactive thyroid (hypothyroidism).    Kidney failure.  SYMPTOMS   Symptoms of this condition include:   · A tingling feeling in your fingers, especially in your thumb, index, and middle fingers.  · Tingling or numbness in your hand.  · An aching feeling in your entire arm, especially when your wrist and elbow are bent for long periods of time.  · Wrist pain that goes up your arm to your shoulder.  · Pain that goes down into your palm or fingers.  · A weak feeling in your hands. You may have trouble grabbing and holding items.  Your symptoms may feel worse during the night.   DIAGNOSIS   This condition is diagnosed with a medical history and physical exam. You may also have tests, including:   · An electromyogram (EMG). This test measures electrical signals sent by your nerves into the muscles.  · X-rays.  TREATMENT   Treatment for this condition includes:  · Lifestyle changes. It is important to stop doing or modify the activity that caused your condition.  · Physical or occupational therapy.  · Medicines for pain and inflammation. This may include medicine that is injected into your wrist.  · A wrist  splint.  · Surgery.  HOME CARE INSTRUCTIONS   If You Have a Splint:  · Wear it as told by your health care provider. Remove it only as told by your health care provider.  · Loosen the splint if your fingers become numb and tingle, or if they turn cold and blue.  · Keep the splint clean and dry.  General Instructions  · Take over-the-counter and prescription medicines only as told by your health care provider.  · Rest your wrist from any activity that may be causing your pain. If your condition is work related, talk to your employer about changes that can be made, such as getting a wrist pad to use while typing.  · If directed, apply ice to the painful area:    Put ice in a plastic bag.    Place a towel between your skin and the bag.    Leave the ice on for 20 minutes, 2-3 times per day.  · Keep all follow-up visits as told by your health care provider. This is important.  · Do any exercises as told by your health care provider, physical therapist, or occupational therapist.  SEEK MEDICAL CARE IF:   · You have new symptoms.  · Your pain is not controlled with medicines.  · Your symptoms get worse.     This information is not intended to replace advice given to you by your health care provider. Make sure you discuss any questions you have with your health care provider.     Document Released: 12/15/2001 Document Revised: 04/10/2017 Document Reviewed: 05/04/2016  [employer] Interactive Patient Education ©2017 [employer]

## 2017-06-05 NOTE — PROGRESS NOTES
Chief Complaint  Coby Roland is a 76 y.o. female who presents for Numbness (Both hands, for about a week and a half, was mostly the right hand. Woke up this morning with her left hand numb. Right hand has went completely numb twice today. Thinks that during the spring she carries big bags of mulch of soil 40-50 lbs. )  .    History of Present Illness   Her hands have started to go numb when she uses them.  After a while, the sensation comes back.  It's worse in her right hand.  She is right handed.  She had been coloring a lot in the new coloring books.  This doesn't seem to provoke it too much, though.  Her hand gets tingly.  She hasn't tried taking any ibuprofen.  She actually has wrist splints at home but has not been wearing them.         Review of Systems   Constitutional: Negative for diaphoresis, fatigue and fever.   Respiratory: Negative for shortness of breath.    Cardiovascular: Negative for chest pain and palpitations.   Gastrointestinal: Negative for abdominal pain, nausea and vomiting.   Musculoskeletal: Negative for back pain and neck pain.   Neurological: Positive for numbness. Negative for dizziness, syncope, weakness, light-headedness and headaches.   Psychiatric/Behavioral: Negative for confusion.       Patient Active Problem List   Diagnosis   • Abnormal chest x-ray   • Abnormal electrocardiogram   • Blood glucose abnormal   • Dizziness   • Hearing loss   • Hoarseness   • Hyperlipidemia   • Hypertension   • Knee pain   • Onychomycosis of toenail   • Parkinsonian features   • Hyperhidrosis   • Tremor   • Left thigh pain   • Anxiety and depression   • Left groin pain   • Memory loss or impairment   • Episodic lightheadedness       Past Medical History:   Diagnosis Date   • Allergic     Penicillin    • Arthritis    • Cataract    • Depression    • Hot flashes     with recent cessation of estrogen therapy   • Hyperlipidemia    • Hyperlipidemia    • Hypertension    • Migraine headache        Past  Surgical History:   Procedure Laterality Date   • COLONOSCOPY  2010    Dr. Michaud   • COLONOSCOPY N/A 12/13/2016    Procedure: COLONOSCOPY to cecum and TI with cold polypectomy;  Surgeon: Mary Mac MD;  Location: [facility] ENDOSCOPY;  Service:    • DILATION AND CURETTAGE, DIAGNOSTIC / THERAPEUTIC     • HYSTERECTOMY     • OTHER SURGICAL HISTORY      cataract surgery       Family History   Problem Relation Age of Onset   • Coronary artery disease Mother    • Other Father      cerebrovascular accident (cva)   • Coronary artery disease Father    • Coronary artery disease Brother    • Diabetes Brother      type 2   • Lung cancer Brother    • Ulcerative colitis Brother    • Breast cancer Daughter        Social History     Social History   • Marital status:      Spouse name: N/A   • Number of children: N/A   • Years of education: N/A     Occupational History   • Not on file.     Social History Main Topics   • Smoking status: Never Smoker   • Smokeless tobacco: Never Used   • Alcohol use No   • Drug use: No   • Sexual activity: Defer     Other Topics Concern   • Not on file     Social History Narrative       Current Outpatient Prescriptions on File Prior to Visit   Medication Sig Dispense Refill   • ALPRAZolam (XANAX) 0.5 MG tablet Take 1 tablet by mouth 2 (Two) Times a Day. (Patient taking differently: Take 0.5 mg by mouth At Night As Needed.) 60 tablet 2   • buPROPion SR (WELLBUTRIN SR) 200 MG 12 hr tablet Take 1 tablet by mouth 2 (Two) Times a Day. 60 tablet 6   • esomeprazole (NEXIUM) 20 MG capsule Take 1 capsule by mouth Every Morning Before Breakfast. 30 capsule    • FLUoxetine (PROzac) 20 MG capsule TAKE TWO CAPSULES BY MOUTH DAILY 60 capsule 2   • lovastatin (MEVACOR) 40 MG tablet TAKE ONE TABLET BY MOUTH EVERY NIGHT AT BEDTIME 90 tablet 3   • melatonin tablet Take by mouth. Take as directed     • SUMAtriptan (IMITREX) 100 MG tablet TAKE 1 TABLET BY MOUTH AT ONSET OF MIGRAINE HEADACHE. MAY REPEAT IN 2  "HOURS IF NEEDED. DO NOT EXCEED 2 TABLETS IN 24 HOURS 9 tablet 4   • valsartan (DIOVAN) 40 MG tablet TAKE ONE TABLET BY MOUTH EVERY NIGHT AT BEDTIME 30 tablet 4   • dicyclomine (BENTYL) 20 MG tablet Take 1 tablet by mouth 4 (Four) Times a Day As Needed (llq pain). 60 tablet 3   • meloxicam (MOBIC) 7.5 MG tablet Take 7.5 mg by mouth Daily.       No current facility-administered medications on file prior to visit.        Allergies   Allergen Reactions   • Penicillins Swelling       Vitals:    06/05/17 1413   BP: 118/62   Pulse: 74   Resp: 18   SpO2: 98%   Weight: 130 lb (59 kg)   Height: 62\" (157.5 cm)       Body mass index is 23.78 kg/(m^2).    Objective   Physical Exam   Constitutional: She is oriented to person, place, and time. She appears well-developed and well-nourished. No distress.   HENT:   Head: Normocephalic and atraumatic.   Musculoskeletal:        Right wrist: She exhibits normal range of motion and no tenderness.        Left wrist: She exhibits normal range of motion and no tenderness.   + phalen test bilat.  Tingling developed within one minute.   Neurological: She is alert and oriented to person, place, and time. She has normal strength. No cranial nerve deficit.   Psychiatric: She has a normal mood and affect. Her behavior is normal. Judgment and thought content normal.       Assessment/Plan   Diagnoses and all orders for this visit:    Bilateral carpal tunnel syndrome        Discussion/Summary  Coby is here for acute care for a new issue.  She has CTS bilaterally.  Advised to use meloxicam that she has daily and to use the wrist splints that she was given in the past.  She has been doing a lot of coloring and yard work.  Advised to hold off on these kind of repetitive motion activities until symptoms improve.  If she has to do them, wear the wrist splints.  Add B complex vitamin.  If symptoms do not improve with conservative therapy, advised to call and will refer to Hand specialist.    No " Follow-up on file.

## 2017-06-14 ENCOUNTER — TREATMENT (OUTPATIENT)
Dept: PHYSICAL THERAPY | Facility: CLINIC | Age: 77
End: 2017-06-14

## 2017-06-14 DIAGNOSIS — M25.552 LEFT HIP PAIN: Primary | ICD-10-CM

## 2017-06-14 PROCEDURE — 97110 THERAPEUTIC EXERCISES: CPT | Performed by: PHYSICAL THERAPIST

## 2017-06-14 NOTE — PROGRESS NOTES
Physical Therapy Discharge  Visit: 6    Coby Roland reports: I am feeling really really good. Going up the steps with both legs now and having minimal-no pain. Pain reported as 0/10    Subjective     Objective     Strength/Myotome Testing     Left Hip   Planes of Motion   Flexion: 4+  Extension: 5  Abduction: 5  External rotation: 5  Internal rotation: 5    Right Hip   Planes of Motion   Flexion: 5  Extension: 5  Abduction: 5  External rotation: 5  Internal rotation: 5    Ambulation     Ambulation: Stairs   Pattern: reciprocal  Pattern: reciprocal     See Exercise, Manual, and Modality Logs for complete treatment.       Assessment & Plan     Assessment  Assessment details: Pt did excellent with therapy. Pt has met most all goals and will be DC'd at this time    Plan  Plan details: Dc at this time        Patient goal: SHORT TERM GOALS: 3 weeks  1. Patient to be compliant with HEP and demo good efficiency with TE (MET)  2. Report < 2 sleep disturbances 2° hip pain. (MET)  3. Increased Lumbar and SIJ mobility to allow for improved pelvic alignment and gait mechanics (equal step length and level pelvis throughout gait). (MET)  4. Increased hip ER/IR ROM to WFL (IR to 30°) degrees to allow for increased ease with bed mobility and squatting. (MET)  5. Pt will report minimal-no tenderness to palpation with firm pressure. (MET)  6. Pt. Able to ambulate up to 60 min with pain < 1/10 with acceptable pattern. (MET)    LONG TERM GOALS: 2 months  1. Pt. to score > 70/80 perceived ability on LEFS (NOT MET)  2. Pain level < 0/10 in hips with all activities including sitting > 2 hr continuously. (MET)  3. Hip AROM to WNL to allow for return to ADL’s/IADLS and functional activities without increased symptoms (MET)  4. Hip strength to 4+/5 to allow for pushing, pulling and more strenuous activities to occur without pain. (MET)  5. No palpable tenderness to the hip. (MET)    Manual Therapy:    -     mins  27796;  Therapeutic  Exercise:    34     mins  32457;     Neuromuscular Yrn:    -    mins  12516;    Therapeutic Activity:     -     mins  02351;     Gait Training:      -     mins  91893;     Ultrasound:     -     mins  20960;    Electrical Stimulation:    -     mins  92399 ( );  Dry Needling     -     mins self-pay    Timed Treatment:   34   mins   Total Treatment:     50   mins    JENNY Corley License #: [id number]    Physical Therapist

## 2017-06-19 RX ORDER — VALSARTAN 40 MG/1
TABLET ORAL
Qty: 30 TABLET | Refills: 3 | Status: SHIPPED | OUTPATIENT
Start: 2017-06-19 | End: 2017-10-10 | Stop reason: SDUPTHER

## 2017-07-06 ENCOUNTER — APPOINTMENT (OUTPATIENT)
Dept: CT IMAGING | Facility: HOSPITAL | Age: 77
End: 2017-07-06

## 2017-07-06 ENCOUNTER — HOSPITAL ENCOUNTER (EMERGENCY)
Facility: HOSPITAL | Age: 77
Discharge: HOME | End: 2017-07-06
Attending: FAMILY MEDICINE | Admitting: FAMILY MEDICINE

## 2017-07-06 VITALS
BODY MASS INDEX: 22.08 KG/M2 | DIASTOLIC BLOOD PRESSURE: 73 MMHG | HEIGHT: 62 IN | RESPIRATION RATE: 16 BRPM | SYSTOLIC BLOOD PRESSURE: 148 MMHG | HEART RATE: 76 BPM | TEMPERATURE: 99.4 F | OXYGEN SATURATION: 97 % | WEIGHT: 120 LBS

## 2017-07-06 DIAGNOSIS — S29.019A THORACIC MYOFASCIAL STRAIN, INITIAL ENCOUNTER: Primary | ICD-10-CM

## 2017-07-06 LAB
ALBUMIN SERPL-MCNC: 3.9 G/DL (ref 3.5–5.2)
ALBUMIN/GLOB SERPL: 1.6 G/DL
ALP SERPL-CCNC: 63 U/L (ref 39–117)
ALT SERPL W P-5'-P-CCNC: 18 U/L (ref 1–33)
ANION GAP SERPL CALCULATED.3IONS-SCNC: 13.5 MMOL/L
AST SERPL-CCNC: 21 U/L (ref 1–32)
BACTERIA UR QL AUTO: ABNORMAL /HPF
BASOPHILS # BLD AUTO: 0.02 10*3/MM3 (ref 0–0.2)
BASOPHILS NFR BLD AUTO: 0.4 % (ref 0–1.5)
BILIRUB SERPL-MCNC: 0.4 MG/DL (ref 0.1–1.2)
BILIRUB UR QL STRIP: NEGATIVE
BUN BLD-MCNC: 22 MG/DL (ref 8–23)
BUN/CREAT SERPL: 23.9 (ref 7–25)
CALCIUM SPEC-SCNC: 9.2 MG/DL (ref 8.6–10.5)
CHLORIDE SERPL-SCNC: 100 MMOL/L (ref 98–107)
CLARITY UR: CLEAR
CO2 SERPL-SCNC: 24.5 MMOL/L (ref 22–29)
COLOR UR: YELLOW
CREAT BLD-MCNC: 0.92 MG/DL (ref 0.57–1)
DEPRECATED RDW RBC AUTO: 45.3 FL (ref 37–54)
EOSINOPHIL # BLD AUTO: 0.29 10*3/MM3 (ref 0–0.7)
EOSINOPHIL NFR BLD AUTO: 5.2 % (ref 0.3–6.2)
ERYTHROCYTE [DISTWIDTH] IN BLOOD BY AUTOMATED COUNT: 13.6 % (ref 11.7–13)
GFR SERPL CREATININE-BSD FRML MDRD: 60 ML/MIN/1.73
GLOBULIN UR ELPH-MCNC: 2.5 GM/DL
GLUCOSE BLD-MCNC: 106 MG/DL (ref 65–99)
GLUCOSE UR STRIP-MCNC: NEGATIVE MG/DL
HCT VFR BLD AUTO: 36.6 % (ref 35.6–45.5)
HGB BLD-MCNC: 12.5 G/DL (ref 11.9–15.5)
HGB UR QL STRIP.AUTO: NEGATIVE
HOLD SPECIMEN: NORMAL
HOLD SPECIMEN: NORMAL
HYALINE CASTS UR QL AUTO: ABNORMAL /LPF
IMM GRANULOCYTES # BLD: 0 10*3/MM3 (ref 0–0.03)
IMM GRANULOCYTES NFR BLD: 0 % (ref 0–0.5)
KETONES UR QL STRIP: NEGATIVE
LEUKOCYTE ESTERASE UR QL STRIP.AUTO: ABNORMAL
LIPASE SERPL-CCNC: 26 U/L (ref 13–60)
LYMPHOCYTES # BLD AUTO: 1.94 10*3/MM3 (ref 0.9–4.8)
LYMPHOCYTES NFR BLD AUTO: 35.1 % (ref 19.6–45.3)
MCH RBC QN AUTO: 31 PG (ref 26.9–32)
MCHC RBC AUTO-ENTMCNC: 34.2 G/DL (ref 32.4–36.3)
MCV RBC AUTO: 90.8 FL (ref 80.5–98.2)
MONOCYTES # BLD AUTO: 0.48 10*3/MM3 (ref 0.2–1.2)
MONOCYTES NFR BLD AUTO: 8.7 % (ref 5–12)
NEUTROPHILS # BLD AUTO: 2.8 10*3/MM3 (ref 1.9–8.1)
NEUTROPHILS NFR BLD AUTO: 50.6 % (ref 42.7–76)
NITRITE UR QL STRIP: NEGATIVE
PH UR STRIP.AUTO: 6.5 [PH] (ref 5–8)
PLATELET # BLD AUTO: 236 10*3/MM3 (ref 140–500)
PMV BLD AUTO: 10.3 FL (ref 6–12)
POTASSIUM BLD-SCNC: 4 MMOL/L (ref 3.5–5.2)
PROT SERPL-MCNC: 6.4 G/DL (ref 6–8.5)
PROT UR QL STRIP: NEGATIVE
RBC # BLD AUTO: 4.03 10*6/MM3 (ref 3.9–5.2)
RBC # UR: ABNORMAL /HPF
REF LAB TEST METHOD: ABNORMAL
SODIUM BLD-SCNC: 138 MMOL/L (ref 136–145)
SP GR UR STRIP: 1.01 (ref 1–1.03)
SQUAMOUS #/AREA URNS HPF: ABNORMAL /HPF
UROBILINOGEN UR QL STRIP: ABNORMAL
WBC NRBC COR # BLD: 5.53 10*3/MM3 (ref 4.5–10.7)
WBC UR QL AUTO: ABNORMAL /HPF
WHOLE BLOOD HOLD SPECIMEN: NORMAL
WHOLE BLOOD HOLD SPECIMEN: NORMAL

## 2017-07-06 PROCEDURE — 81001 URINALYSIS AUTO W/SCOPE: CPT | Performed by: FAMILY MEDICINE

## 2017-07-06 PROCEDURE — 83690 ASSAY OF LIPASE: CPT | Performed by: FAMILY MEDICINE

## 2017-07-06 PROCEDURE — 96374 THER/PROPH/DIAG INJ IV PUSH: CPT

## 2017-07-06 PROCEDURE — 99283 EMERGENCY DEPT VISIT LOW MDM: CPT

## 2017-07-06 PROCEDURE — 87086 URINE CULTURE/COLONY COUNT: CPT | Performed by: FAMILY MEDICINE

## 2017-07-06 PROCEDURE — 96375 TX/PRO/DX INJ NEW DRUG ADDON: CPT

## 2017-07-06 PROCEDURE — 85025 COMPLETE CBC W/AUTO DIFF WBC: CPT | Performed by: FAMILY MEDICINE

## 2017-07-06 PROCEDURE — 25010000002 ONDANSETRON PER 1 MG: Performed by: FAMILY MEDICINE

## 2017-07-06 PROCEDURE — 25010000002 HYDROMORPHONE PER 4 MG: Performed by: FAMILY MEDICINE

## 2017-07-06 PROCEDURE — 80053 COMPREHEN METABOLIC PANEL: CPT | Performed by: FAMILY MEDICINE

## 2017-07-06 PROCEDURE — 74176 CT ABD & PELVIS W/O CONTRAST: CPT

## 2017-07-06 RX ORDER — HYDROMORPHONE HYDROCHLORIDE 1 MG/ML
0.5 INJECTION, SOLUTION INTRAMUSCULAR; INTRAVENOUS; SUBCUTANEOUS ONCE
Status: COMPLETED | OUTPATIENT
Start: 2017-07-06 | End: 2017-07-06

## 2017-07-06 RX ORDER — SODIUM CHLORIDE 0.9 % (FLUSH) 0.9 %
10 SYRINGE (ML) INJECTION AS NEEDED
Status: DISCONTINUED | OUTPATIENT
Start: 2017-07-06 | End: 2017-07-06 | Stop reason: HOSPADM

## 2017-07-06 RX ORDER — ONDANSETRON 2 MG/ML
4 INJECTION INTRAMUSCULAR; INTRAVENOUS ONCE
Status: COMPLETED | OUTPATIENT
Start: 2017-07-06 | End: 2017-07-06

## 2017-07-06 RX ORDER — HYDROCODONE BITARTRATE AND ACETAMINOPHEN 5; 325 MG/1; MG/1
1 TABLET ORAL 4 TIMES DAILY PRN
Qty: 15 TABLET | Refills: 0 | Status: SHIPPED | OUTPATIENT
Start: 2017-07-06 | End: 2017-08-16

## 2017-07-06 RX ADMIN — HYDROMORPHONE HYDROCHLORIDE 0.5 MG: 1 INJECTION, SOLUTION INTRAMUSCULAR; INTRAVENOUS; SUBCUTANEOUS at 18:06

## 2017-07-06 RX ADMIN — ONDANSETRON 4 MG: 2 INJECTION INTRAMUSCULAR; INTRAVENOUS at 18:06

## 2017-07-06 NOTE — ED PROVIDER NOTES
" EMERGENCY DEPARTMENT ENCOUNTER    CHIEF COMPLAINT  Chief Complaint: Flank pain  History given by: Pt  History limited by: Nothing  Room Number: 23/23  PMD: Marie Ramos MD      HPI:  Pt is a 76 y.o. female who presents complaining of L-sided flank pain onset 1300. Pt denies nausea, SOA, CP, or any other symptoms at this time. She states her pain is exacerbated when she leans to the L. Pt has a hx of nephrolithiasis.    Duration:  1300  Onset: gradual  Timing: constant  Location: L-sided  Radiation: none  Quality: \"pain\"  Intensity/Severity: moderate  Progression: unchanged  Associated Symptoms: none  Aggravating Factors: leaning to the L  Alleviating Factors: none  Previous Episodes: Pt has a hx of kidney stones.  Treatment before arrival: Pt received no treatment PTA.    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   • Tremor 02/01/2016   • Left thigh pain 02/01/2016   • Anxiety and depression 08/08/2016   • Left groin pain 08/08/2016   • Memory loss or impairment 02/08/2017   • Episodic lightheadedness 04/12/2017     Resolved Ambulatory Problems     Diagnosis Date Noted   • Leukopenia 02/01/2016   • Palpitations 02/01/2016   • Acute left lower quadrant pain 08/08/2016     Past Medical History:   Diagnosis Date   • Allergic    • Arthritis    • Cataract    • Depression    • Hot flashes    • Hyperlipidemia    • Hyperlipidemia    • Hypertension    • Migraine headache        PAST SURGICAL HISTORY  Past Surgical History:   Procedure Laterality Date   • COLONOSCOPY  2010    Dr. Michaud   • COLONOSCOPY N/A " 12/13/2016    Procedure: COLONOSCOPY to cecum and TI with cold polypectomy;  Surgeon: Mary Mac MD;  Location: [facility] ENDOSCOPY;  Service:    • DILATION AND CURETTAGE, DIAGNOSTIC / THERAPEUTIC     • HYSTERECTOMY     • OTHER SURGICAL HISTORY      cataract surgery       FAMILY HISTORY  Family History   Problem Relation Age of Onset   • Coronary artery disease Mother    • Other Father      cerebrovascular accident (cva)   • Coronary artery disease Father    • Coronary artery disease Brother    • Diabetes Brother      type 2   • Lung cancer Brother    • Ulcerative colitis Brother    • Breast cancer Daughter        SOCIAL HISTORY  Social History     Social History   • Marital status:      Spouse name: N/A   • Number of children: N/A   • Years of education: N/A     Occupational History   • Not on file.     Social History Main Topics   • Smoking status: Never Smoker   • Smokeless tobacco: Never Used   • Alcohol use No   • Drug use: No   • Sexual activity: Defer     Other Topics Concern   • Not on file     Social History Narrative       ALLERGIES  Penicillins    REVIEW OF SYSTEMS  Review of Systems   Constitutional: Negative for fever.   HENT: Negative for sore throat.    Eyes: Negative.    Respiratory: Negative for cough and shortness of breath.    Cardiovascular: Negative for chest pain.   Gastrointestinal: Negative for abdominal pain, diarrhea and vomiting.   Genitourinary: Positive for flank pain (L-sided). Negative for dysuria.   Musculoskeletal: Negative for neck pain.   Skin: Negative for rash.   Allergic/Immunologic: Negative.    Neurological: Negative for weakness, numbness and headaches.   Hematological: Negative.    Psychiatric/Behavioral: Negative.    All other systems reviewed and are negative.      PHYSICAL EXAM  ED Triage Vitals   Temp Heart Rate Resp BP SpO2   07/06/17 1553 07/06/17 1553 07/06/17 1553 07/06/17 1612 07/06/17 1553   99.4 °F (37.4 °C) 97 15 164/86 98 %      Temp src Heart Rate  Source Patient Position BP Location FiO2 (%)   07/06/17 1553 07/06/17 1553 07/06/17 1612 07/06/17 1612 --   Tympanic Monitor Lying Right arm        Physical Exam   Constitutional: She is oriented to person, place, and time and well-developed, well-nourished, and in no distress. No distress.   HENT:   Head: Normocephalic and atraumatic.   Eyes: EOM are normal. Pupils are equal, round, and reactive to light.   Neck: Normal range of motion. Neck supple.   Cardiovascular: Normal rate, regular rhythm and normal heart sounds.    Pulmonary/Chest: Effort normal and breath sounds normal. No respiratory distress.   Abdominal: Soft. There is tenderness (LUQ). There is no rebound and no guarding.   Musculoskeletal: Normal range of motion. She exhibits no edema.   Neurological: She is alert and oriented to person, place, and time. She has normal sensation and normal strength.   Skin: Skin is warm and dry. No rash noted.   Psychiatric: Mood and affect normal.   Nursing note and vitals reviewed.      LAB RESULTS  Lab Results (last 24 hours)     Procedure Component Value Units Date/Time    CBC & Differential [[id number]] Collected:  07/06/17 1625    Specimen:  Blood Updated:  07/06/17 1635    Narrative:       The following orders were created for panel order CBC & Differential.  Procedure                               Abnormality         Status                     ---------                               -----------         ------                     CBC Auto Differential[[id number]]        Abnormal            Final result                 Please view results for these tests on the individual orders.    Comprehensive Metabolic Panel [[id number]]  (Abnormal) Collected:  07/06/17 1625    Specimen:  Blood Updated:  07/06/17 1700     Glucose 106 (H) mg/dL      BUN 22 mg/dL      Creatinine 0.92 mg/dL      Sodium 138 mmol/L      Potassium 4.0 mmol/L      Chloride 100 mmol/L      CO2 24.5 mmol/L      Calcium 9.2 mg/dL      Total Protein 6.4  g/dL      Albumin 3.90 g/dL      ALT (SGPT) 18 U/L      AST (SGOT) 21 U/L      Alkaline Phosphatase 63 U/L      Total Bilirubin 0.4 mg/dL      eGFR Non African Amer 60 (L) mL/min/1.73      Globulin 2.5 gm/dL      A/G Ratio 1.6 g/dL      BUN/Creatinine Ratio 23.9     Anion Gap 13.5 mmol/L     Narrative:       The MDRD GFR formula is only valid for adults with stable renal function between ages 18 and 70.    Lipase [[id number]]  (Normal) Collected:  07/06/17 1625    Specimen:  Blood Updated:  07/06/17 1700     Lipase 26 U/L     CBC Auto Differential [[id number]]  (Abnormal) Collected:  07/06/17 1625    Specimen:  Blood Updated:  07/06/17 1635     WBC 5.53 10*3/mm3      RBC 4.03 10*6/mm3      Hemoglobin 12.5 g/dL      Hematocrit 36.6 %      MCV 90.8 fL      MCH 31.0 pg      MCHC 34.2 g/dL      RDW 13.6 (H) %      RDW-SD 45.3 fl      MPV 10.3 fL      Platelets 236 10*3/mm3      Neutrophil % 50.6 %      Lymphocyte % 35.1 %      Monocyte % 8.7 %      Eosinophil % 5.2 %      Basophil % 0.4 %      Immature Grans % 0.0 %      Neutrophils, Absolute 2.80 10*3/mm3      Lymphocytes, Absolute 1.94 10*3/mm3      Monocytes, Absolute 0.48 10*3/mm3      Eosinophils, Absolute 0.29 10*3/mm3      Basophils, Absolute 0.02 10*3/mm3      Immature Grans, Absolute 0.00 10*3/mm3     Urinalysis With / Culture If Indicated [[id number]]  (Abnormal) Collected:  07/06/17 1806    Specimen:  Urine from Urine, Clean Catch Updated:  07/06/17 1830     Color, UA Yellow     Appearance, UA Clear     pH, UA 6.5     Specific Gravity, UA 1.008     Glucose, UA Negative     Ketones, UA Negative     Bilirubin, UA Negative     Blood, UA Negative     Protein, UA Negative     Leuk Esterase, UA Moderate (2+) (A)     Nitrite, UA Negative     Urobilinogen, UA 0.2 E.U./dL    Urinalysis, Microscopic Only [[id number]]  (Abnormal) Collected:  07/06/17 1806    Specimen:  Urine from Urine, Clean Catch Updated:  07/06/17 1830     RBC, UA 0-2 /HPF      WBC, UA 13-20 (A) /HPF       Bacteria, UA None Seen /HPF      Squamous Epithelial Cells, UA 0-2 /HPF      Hyaline Casts, UA None Seen /LPF      Methodology Automated Microscopy    Urine Culture [[id number]] Collected:  07/06/17 1806    Specimen:  Urine from Urine, Clean Catch Updated:  07/06/17 1828          I ordered the above labs and reviewed the results    RADIOLOGY  CT Abdomen Pelvis Without Contrast   Preliminary Result   No acute process identified.               I ordered the above noted radiological studies. Interpreted by radiologist. Reviewed by me in PACS.       PROCEDURES  Procedures      PROGRESS AND CONSULTS  ED Course     1621 Ordered CBC, CMP, Lipase, Urinalysis, for further evaluation    1710 Ordered CT Abd/Pel for futher evaluation    1758 Ordered dilaudid and zofran for pain relief    1833 Recheck pt is in NAD. Informed pt we are waiting on urine results. Informed pt of unremarkable CT results.   Her UA shows pyuria, without bacteria and her history does not sound like pyelonephritis.  Will culture.  Her pain clearly appears positional.     1852 BP- 164/86 HR- 77 Temp- 99.4 °F (37.4 °C) (Tympanic) O2 sat- 100%. Rechecked the patient who is in NAD and is resting comfortably. Will discharge. Pt understands and agrees with treatment. All questions and concerns were addressed at this time.    MEDICAL DECISION MAKING  Results were reviewed/discussed with the patient and they were also made aware of online access. Pt also made aware that some labs, such as cultures, will not be resulted during ER visit and follow up with PMD is necessary.     MDM  Number of Diagnoses or Management Options  Thoracic myofascial strain, initial encounter:      Amount and/or Complexity of Data Reviewed  Clinical lab tests: ordered and reviewed (Glucose - 106)  Tests in the radiology section of CPT®: ordered and reviewed (CT Abd/Pel shows NAD.)  Decide to obtain previous medical records or to obtain history from someone other than the patient:  yes  Review and summarize past medical records: yes  Independent visualization of images, tracings, or specimens: yes    Patient Progress  Patient progress: stable         DIAGNOSIS  Final diagnoses:   Thoracic myofascial strain, initial encounter       DISPOSITION  DISCHARGE    Patient discharged in stable condition.    Reviewed implications of results, diagnosis, meds, responsibility to follow up, warning signs and symptoms of possible worsening, potential complications and reasons to return to ER, including new or worsening symptoms.    Patient/Family voiced understanding of above instructions.    Discussed plan for discharge, as there is no emergent indication for admission.  Pt/family is agreeable and understands need for follow up and repeat testing.  Pt is aware that discharge does not mean that nothing is wrong but it indicates no emergency is present that requires admission and they must continue care with follow-up as given below or physician of their choice.     FOLLOW-UP  Marie Ramos MD  [address] [address]  [phone]      We'd lilke you rechecked early next week if not clearly improving         Medication List      New Prescriptions          HYDROcodone-acetaminophen 5-325 MG per tablet   Commonly known as:  NORCO   Take 1 tablet by mouth 4 (Four) Times a Day As Needed (pain).         Changed          ALPRAZolam 0.5 MG tablet   Commonly known as:  XANAX   Take 1 tablet by mouth 2 (Two) Times a Day.   What changed:    - when to take this  - reasons to take this               Latest Documented Vital Signs:  As of 6:56 PM  BP- 164/86 HR- 77 Temp- 99.4 °F (37.4 °C) (Tympanic) O2 sat- 100%    --  Documentation assistance provided by jeferson Das for Dr. Jenkins.  Information recorded by the jeferson was done at my direction and has been verified and validated by me.     Astrid Das  07/06/17 0052       Astrid Das  07/06/17 1139       Padilla Jenkins,  MD  07/07/17 0040

## 2017-07-06 NOTE — DISCHARGE INSTRUCTIONS
Return if you worsen or develop new symptoms.    We will culture you urine to check for an occult UTI>

## 2017-07-07 ENCOUNTER — TELEPHONE (OUTPATIENT)
Dept: SOCIAL WORK | Facility: HOSPITAL | Age: 77
End: 2017-07-07

## 2017-07-08 LAB — BACTERIA SPEC AEROBE CULT: NO GROWTH

## 2017-08-07 ENCOUNTER — TRANSCRIBE ORDERS (OUTPATIENT)
Dept: ADMINISTRATIVE | Facility: HOSPITAL | Age: 77
End: 2017-08-07

## 2017-08-07 DIAGNOSIS — Z12.31 VISIT FOR SCREENING MAMMOGRAM: Primary | ICD-10-CM

## 2017-08-07 RX ORDER — ALPRAZOLAM 0.5 MG/1
0.5 TABLET ORAL NIGHTLY PRN
Qty: 90 TABLET | Refills: 0 | Status: SHIPPED | OUTPATIENT
Start: 2017-08-07 | End: 2017-11-01 | Stop reason: SDUPTHER

## 2017-08-07 NOTE — TELEPHONE ENCOUNTER
I approved a 90 days supply instead of getting sixty at a time.  Please call and let her know I changed the script since to match how she is taking this.

## 2017-08-11 DIAGNOSIS — E78.2 MIXED HYPERLIPIDEMIA: Primary | ICD-10-CM

## 2017-08-11 DIAGNOSIS — I10 ESSENTIAL HYPERTENSION: ICD-10-CM

## 2017-08-12 LAB
ALBUMIN SERPL-MCNC: 3.9 G/DL (ref 3.5–5.2)
ALBUMIN/GLOB SERPL: 1.6 G/DL
ALP SERPL-CCNC: 62 U/L (ref 39–117)
ALT SERPL-CCNC: 17 U/L (ref 1–33)
AST SERPL-CCNC: 20 U/L (ref 1–32)
BASOPHILS # BLD AUTO: 0.02 10*3/MM3 (ref 0–0.2)
BASOPHILS NFR BLD AUTO: 0.3 % (ref 0–1.5)
BILIRUB SERPL-MCNC: 0.6 MG/DL (ref 0.1–1.2)
BUN SERPL-MCNC: 22 MG/DL (ref 8–23)
BUN/CREAT SERPL: 24.2 (ref 7–25)
CALCIUM SERPL-MCNC: 9.2 MG/DL (ref 8.6–10.5)
CHLORIDE SERPL-SCNC: 102 MMOL/L (ref 98–107)
CO2 SERPL-SCNC: 26.5 MMOL/L (ref 22–29)
CREAT SERPL-MCNC: 0.91 MG/DL (ref 0.57–1)
EOSINOPHIL # BLD AUTO: 0.28 10*3/MM3 (ref 0–0.7)
EOSINOPHIL NFR BLD AUTO: 4.8 % (ref 0.3–6.2)
ERYTHROCYTE [DISTWIDTH] IN BLOOD BY AUTOMATED COUNT: 14.1 % (ref 11.7–13)
GLOBULIN SER CALC-MCNC: 2.4 GM/DL
GLUCOSE SERPL-MCNC: 95 MG/DL (ref 65–99)
HCT VFR BLD AUTO: 39.7 % (ref 35.6–45.5)
HGB BLD-MCNC: 12.9 G/DL (ref 11.9–15.5)
IMM GRANULOCYTES # BLD: 0 10*3/MM3 (ref 0–0.03)
IMM GRANULOCYTES NFR BLD: 0 % (ref 0–0.5)
LYMPHOCYTES # BLD AUTO: 1.92 10*3/MM3 (ref 0.9–4.8)
LYMPHOCYTES NFR BLD AUTO: 33 % (ref 19.6–45.3)
MCH RBC QN AUTO: 31.4 PG (ref 26.9–32)
MCHC RBC AUTO-ENTMCNC: 32.5 G/DL (ref 32.4–36.3)
MCV RBC AUTO: 96.6 FL (ref 80.5–98.2)
MONOCYTES # BLD AUTO: 0.53 10*3/MM3 (ref 0.2–1.2)
MONOCYTES NFR BLD AUTO: 9.1 % (ref 5–12)
NEUTROPHILS # BLD AUTO: 3.06 10*3/MM3 (ref 1.9–8.1)
NEUTROPHILS NFR BLD AUTO: 52.8 % (ref 42.7–76)
PLATELET # BLD AUTO: 243 10*3/MM3 (ref 140–500)
POTASSIUM SERPL-SCNC: 4.9 MMOL/L (ref 3.5–5.2)
PROT SERPL-MCNC: 6.3 G/DL (ref 6–8.5)
RBC # BLD AUTO: 4.11 10*6/MM3 (ref 3.9–5.2)
SODIUM SERPL-SCNC: 140 MMOL/L (ref 136–145)
WBC # BLD AUTO: 5.81 10*3/MM3 (ref 4.5–10.7)

## 2017-08-14 ENCOUNTER — HOSPITAL ENCOUNTER (OUTPATIENT)
Dept: MAMMOGRAPHY | Facility: HOSPITAL | Age: 77
Discharge: HOME | End: 2017-08-14
Admitting: INTERNAL MEDICINE

## 2017-08-14 DIAGNOSIS — Z12.31 VISIT FOR SCREENING MAMMOGRAM: ICD-10-CM

## 2017-08-14 PROCEDURE — G0202 SCR MAMMO BI INCL CAD: HCPCS

## 2017-08-14 PROCEDURE — 77063 BREAST TOMOSYNTHESIS BI: CPT

## 2017-08-16 ENCOUNTER — OFFICE VISIT (OUTPATIENT)
Dept: INTERNAL MEDICINE | Facility: CLINIC | Age: 77
End: 2017-08-16

## 2017-08-16 VITALS
DIASTOLIC BLOOD PRESSURE: 66 MMHG | SYSTOLIC BLOOD PRESSURE: 118 MMHG | HEIGHT: 62 IN | WEIGHT: 124 LBS | BODY MASS INDEX: 22.82 KG/M2 | HEART RATE: 74 BPM | RESPIRATION RATE: 18 BRPM | OXYGEN SATURATION: 99 %

## 2017-08-16 DIAGNOSIS — E78.2 MIXED HYPERLIPIDEMIA: ICD-10-CM

## 2017-08-16 DIAGNOSIS — G43.709 CHRONIC MIGRAINE WITHOUT AURA WITHOUT STATUS MIGRAINOSUS, NOT INTRACTABLE: ICD-10-CM

## 2017-08-16 DIAGNOSIS — F32.A ANXIETY AND DEPRESSION: ICD-10-CM

## 2017-08-16 DIAGNOSIS — I10 ESSENTIAL HYPERTENSION: Primary | ICD-10-CM

## 2017-08-16 DIAGNOSIS — F41.9 ANXIETY AND DEPRESSION: ICD-10-CM

## 2017-08-16 PROBLEM — R41.3 MEMORY LOSS OR IMPAIRMENT: Status: RESOLVED | Noted: 2017-02-08 | Resolved: 2017-08-16

## 2017-08-16 PROBLEM — R42 EPISODIC LIGHTHEADEDNESS: Status: RESOLVED | Noted: 2017-04-12 | Resolved: 2017-08-16

## 2017-08-16 PROCEDURE — 99214 OFFICE O/P EST MOD 30 MIN: CPT | Performed by: INTERNAL MEDICINE

## 2017-08-16 NOTE — PROGRESS NOTES
Chief Complaint  Coby Roland is a 76 y.o. female who presents for Hyperlipidemia; Hypertension; and Follow-up (4 month, states that her memory has improved greatly, can remember the names of her grandchildren now. )  .    History of Present Illness   Coby is here for routine follow up on her HLD and HTN.  No cp or palp or dizziness or soa or edema.  She is wearing the wrist splints for her CTS which really helps.  Her memory is much better now that she has decreased her xanax.  Her anxiety is doing very well.  She still has migraines occasionally, but has not had any this fall yet.  She only fills the imitrex once or twice a year.        Review of Systems   Constitution: Negative for malaise/fatigue, weight gain and weight loss.   Cardiovascular: Negative for chest pain, dyspnea on exertion and leg swelling.   Musculoskeletal: Negative for arthritis and myalgias.   Psychiatric/Behavioral: Positive for depression (controlled). Nervous/anxious: controlled.        Patient Active Problem List   Diagnosis   • Abnormal chest x-ray   • Abnormal electrocardiogram   • Blood glucose abnormal   • Hearing loss   • Hoarseness   • Hyperlipidemia   • Hypertension   • Knee pain   • Onychomycosis of toenail   • Parkinsonian features   • Hyperhidrosis   • Tremor   • Anxiety and depression   • Migraine headache       Past Medical History:   Diagnosis Date   • Allergic     Penicillin    • Arthritis    • Cataract    • Depression    • Hot flashes     with recent cessation of estrogen therapy   • Hyperlipidemia    • Hyperlipidemia    • Hypertension    • Migraine headache        Past Surgical History:   Procedure Laterality Date   • COLONOSCOPY  2010    Dr. Michaud   • COLONOSCOPY N/A 12/13/2016    Procedure: COLONOSCOPY to cecum and TI with cold polypectomy;  Surgeon: Mary Mac MD;  Location: [facility] ENDOSCOPY;  Service:    • DILATION AND CURETTAGE, DIAGNOSTIC / THERAPEUTIC     • HYSTERECTOMY     • OTHER SURGICAL HISTORY       cataract surgery       Family History   Problem Relation Age of Onset   • Coronary artery disease Mother    • Other Father      cerebrovascular accident (cva)   • Coronary artery disease Father    • Coronary artery disease Brother    • Diabetes Brother      type 2   • Lung cancer Brother    • Ulcerative colitis Brother    • Breast cancer Daughter        Social History     Social History   • Marital status:      Spouse name: N/A   • Number of children: N/A   • Years of education: N/A     Occupational History   • Not on file.     Social History Main Topics   • Smoking status: Never Smoker   • Smokeless tobacco: Never Used   • Alcohol use No   • Drug use: No   • Sexual activity: Defer     Other Topics Concern   • Not on file     Social History Narrative       Current Outpatient Prescriptions on File Prior to Visit   Medication Sig Dispense Refill   • ALPRAZolam (XANAX) 0.5 MG tablet Take 1 tablet by mouth At Night As Needed for Anxiety. 90 tablet 0   • buPROPion SR (WELLBUTRIN SR) 200 MG 12 hr tablet Take 1 tablet by mouth 2 (Two) Times a Day. 60 tablet 6   • FLUoxetine (PROzac) 20 MG capsule TAKE TWO CAPSULES BY MOUTH DAILY 60 capsule 2   • lovastatin (MEVACOR) 40 MG tablet TAKE ONE TABLET BY MOUTH EVERY NIGHT AT BEDTIME 90 tablet 3   • melatonin tablet Take by mouth. Take as directed     • meloxicam (MOBIC) 7.5 MG tablet Take 7.5 mg by mouth Daily.     • SUMAtriptan (IMITREX) 100 MG tablet TAKE 1 TABLET BY MOUTH AT ONSET OF MIGRAINE HEADACHE. MAY REPEAT IN 2 HOURS IF NEEDED. DO NOT EXCEED 2 TABLETS IN 24 HOURS 9 tablet 4   • valsartan (DIOVAN) 40 MG tablet TAKE ONE TABLET BY MOUTH EVERY NIGHT AT BEDTIME 30 tablet 3   • [DISCONTINUED] dicyclomine (BENTYL) 20 MG tablet Take 1 tablet by mouth 4 (Four) Times a Day As Needed (llq pain). 60 tablet 3   • [DISCONTINUED] esomeprazole (NEXIUM) 20 MG capsule Take 1 capsule by mouth Every Morning Before Breakfast. 30 capsule    • [DISCONTINUED]  "HYDROcodone-acetaminophen (NORCO) 5-325 MG per tablet Take 1 tablet by mouth 4 (Four) Times a Day As Needed (pain). 15 tablet 0     No current facility-administered medications on file prior to visit.        Allergies   Allergen Reactions   • Penicillins Swelling       Vitals:    08/16/17 1018   BP: 118/66   Pulse: 74   Resp: 18   SpO2: 99%   Weight: 124 lb (56.2 kg)   Height: 62\" (157.5 cm)       Body mass index is 22.68 kg/(m^2).    Objective   Physical Exam   Constitutional: She is oriented to person, place, and time. She appears well-developed and well-nourished. No distress.   HENT:   Head: Normocephalic and atraumatic.   Eyes: Conjunctivae are normal. No scleral icterus.   Neck: Normal range of motion. Neck supple.   Cardiovascular: Normal rate, regular rhythm and normal heart sounds.  Exam reveals no gallop and no friction rub.    No murmur heard.  Pulmonary/Chest: Effort normal and breath sounds normal. No respiratory distress. She has no wheezes. She has no rales.   Musculoskeletal: She exhibits no edema.   Lymphadenopathy:     She has no cervical adenopathy.   Neurological: She is alert and oriented to person, place, and time. No cranial nerve deficit.   Psychiatric: She has a normal mood and affect. Her behavior is normal. Judgment and thought content normal.       Results for orders placed or performed in visit on 08/11/17   Comprehensive Metabolic Panel   Result Value Ref Range    Glucose 95 65 - 99 mg/dL    BUN 22 8 - 23 mg/dL    Creatinine 0.91 0.57 - 1.00 mg/dL    eGFR Non African Am 60 (L) >60 mL/min/1.73    eGFR African Am 73 >60 mL/min/1.73    BUN/Creatinine Ratio 24.2 7.0 - 25.0    Sodium 140 136 - 145 mmol/L    Potassium 4.9 3.5 - 5.2 mmol/L    Chloride 102 98 - 107 mmol/L    Total CO2 26.5 22.0 - 29.0 mmol/L    Calcium 9.2 8.6 - 10.5 mg/dL    Total Protein 6.3 6.0 - 8.5 g/dL    Albumin 3.90 3.50 - 5.20 g/dL    Globulin 2.4 gm/dL    A/G Ratio 1.6 g/dL    Total Bilirubin 0.6 0.1 - 1.2 mg/dL    " Alkaline Phosphatase 62 39 - 117 U/L    AST (SGOT) 20 1 - 32 U/L    ALT (SGPT) 17 1 - 33 U/L   CBC & Differential   Result Value Ref Range    WBC 5.81 4.50 - 10.70 10*3/mm3    RBC 4.11 3.90 - 5.20 10*6/mm3    Hemoglobin 12.9 11.9 - 15.5 g/dL    Hematocrit 39.7 35.6 - 45.5 %    MCV 96.6 80.5 - 98.2 fL    MCH 31.4 26.9 - 32.0 pg    MCHC 32.5 32.4 - 36.3 g/dL    RDW 14.1 (H) 11.7 - 13.0 %    Platelets 243 140 - 500 10*3/mm3    Neutrophil Rel % 52.8 42.7 - 76.0 %    Lymphocyte Rel % 33.0 19.6 - 45.3 %    Monocyte Rel % 9.1 5.0 - 12.0 %    Eosinophil Rel % 4.8 0.3 - 6.2 %    Basophil Rel % 0.3 0.0 - 1.5 %    Neutrophils Absolute 3.06 1.90 - 8.10 10*3/mm3    Lymphocytes Absolute 1.92 0.90 - 4.80 10*3/mm3    Monocytes Absolute 0.53 0.20 - 1.20 10*3/mm3    Eosinophils Absolute 0.28 0.00 - 0.70 10*3/mm3    Basophils Absolute 0.02 0.00 - 0.20 10*3/mm3    Immature Granulocyte Rel % 0.0 0.0 - 0.5 %    Immature Grans Absolute 0.00 0.00 - 0.03 10*3/mm3       Assessment/Plan   Diagnoses and all orders for this visit:    Essential hypertension    Mixed hyperlipidemia    Anxiety and depression    Chronic migraine without aura without status migrainosus, not intractable        Discussion/Summary  Coby is here for routine follow up.  She is doing very well.  Her labs all look great.  Continue all current meds.    .    Return in about 6 months (around 2/16/2018) for Annual physical, with fasting labs prior.

## 2017-08-21 RX ORDER — FLUOXETINE HYDROCHLORIDE 20 MG/1
CAPSULE ORAL
Qty: 60 CAPSULE | Refills: 3 | Status: SHIPPED | OUTPATIENT
Start: 2017-08-21 | End: 2017-12-19 | Stop reason: SDUPTHER

## 2017-09-25 RX ORDER — BUPROPION HYDROCHLORIDE 200 MG/1
TABLET, EXTENDED RELEASE ORAL
Qty: 60 TABLET | Refills: 5 | Status: SHIPPED | OUTPATIENT
Start: 2017-09-25 | End: 2018-04-01 | Stop reason: SDUPTHER

## 2017-10-10 RX ORDER — VALSARTAN 40 MG/1
TABLET ORAL
Qty: 30 TABLET | Refills: 2 | Status: SHIPPED | OUTPATIENT
Start: 2017-10-10 | End: 2018-01-10 | Stop reason: SDUPTHER

## 2017-11-01 ENCOUNTER — TELEPHONE (OUTPATIENT)
Dept: INTERNAL MEDICINE | Facility: CLINIC | Age: 77
End: 2017-11-01

## 2017-11-01 RX ORDER — ALPRAZOLAM 0.5 MG/1
0.5 TABLET ORAL NIGHTLY PRN
Qty: 90 TABLET | Refills: 0 | OUTPATIENT
Start: 2017-11-01 | End: 2018-01-28 | Stop reason: SDUPTHER

## 2017-12-19 RX ORDER — FLUOXETINE HYDROCHLORIDE 20 MG/1
CAPSULE ORAL
Qty: 60 CAPSULE | Refills: 2 | Status: SHIPPED | OUTPATIENT
Start: 2017-12-19 | End: 2018-04-01 | Stop reason: SDUPTHER

## 2018-01-11 RX ORDER — VALSARTAN 40 MG/1
TABLET ORAL
Qty: 30 TABLET | Refills: 3 | Status: SHIPPED | OUTPATIENT
Start: 2018-01-11 | End: 2018-05-14 | Stop reason: SDUPTHER

## 2018-01-29 RX ORDER — ALPRAZOLAM 0.5 MG/1
TABLET ORAL
Qty: 90 TABLET | Refills: 0 | OUTPATIENT
Start: 2018-01-29 | End: 2018-09-05

## 2018-01-29 RX ORDER — SUMATRIPTAN 100 MG/1
TABLET, FILM COATED ORAL
Qty: 9 TABLET | Refills: 3 | Status: SHIPPED | OUTPATIENT
Start: 2018-01-29 | End: 2018-12-27 | Stop reason: SDUPTHER

## 2018-01-29 RX ORDER — LOVASTATIN 40 MG/1
TABLET ORAL
Qty: 90 TABLET | Refills: 2 | Status: SHIPPED | OUTPATIENT
Start: 2018-01-29 | End: 2018-10-30 | Stop reason: SDUPTHER

## 2018-02-08 DIAGNOSIS — Z00.00 HEALTH CARE MAINTENANCE: Primary | ICD-10-CM

## 2018-02-08 DIAGNOSIS — R73.09 BLOOD GLUCOSE ABNORMAL: ICD-10-CM

## 2018-02-08 DIAGNOSIS — E78.2 MIXED HYPERLIPIDEMIA: ICD-10-CM

## 2018-02-08 DIAGNOSIS — I10 ESSENTIAL HYPERTENSION: ICD-10-CM

## 2018-02-08 LAB
ALBUMIN SERPL-MCNC: 4.5 G/DL (ref 3.5–5.2)
ALBUMIN/GLOB SERPL: 2.4 G/DL
ALP SERPL-CCNC: 64 U/L (ref 39–117)
ALT SERPL-CCNC: 15 U/L (ref 1–33)
AST SERPL-CCNC: 22 U/L (ref 1–32)
BASOPHILS # BLD AUTO: 0.03 10*3/MM3 (ref 0–0.2)
BASOPHILS NFR BLD AUTO: 0.6 % (ref 0–1.5)
BILIRUB SERPL-MCNC: 0.7 MG/DL (ref 0.1–1.2)
BUN SERPL-MCNC: 18 MG/DL (ref 8–23)
BUN/CREAT SERPL: 20.2 (ref 7–25)
CALCIUM SERPL-MCNC: 9.5 MG/DL (ref 8.6–10.5)
CHLORIDE SERPL-SCNC: 104 MMOL/L (ref 98–107)
CHOLEST SERPL-MCNC: 155 MG/DL (ref 0–200)
CO2 SERPL-SCNC: 32.7 MMOL/L (ref 22–29)
CREAT SERPL-MCNC: 0.89 MG/DL (ref 0.57–1)
EOSINOPHIL # BLD AUTO: 0.24 10*3/MM3 (ref 0–0.7)
EOSINOPHIL NFR BLD AUTO: 4.9 % (ref 0.3–6.2)
ERYTHROCYTE [DISTWIDTH] IN BLOOD BY AUTOMATED COUNT: 13.8 % (ref 11.7–13)
GFR SERPLBLD CREATININE-BSD FMLA CKD-EPI: 62 ML/MIN/1.73
GFR SERPLBLD CREATININE-BSD FMLA CKD-EPI: 75 ML/MIN/1.73
GLOBULIN SER CALC-MCNC: 1.9 GM/DL
GLUCOSE SERPL-MCNC: 100 MG/DL (ref 65–99)
HBA1C MFR BLD: 4.7 % (ref 4.8–5.6)
HCT VFR BLD AUTO: 40.7 % (ref 35.6–45.5)
HDLC SERPL-MCNC: 64 MG/DL (ref 40–60)
HGB BLD-MCNC: 13.2 G/DL (ref 11.9–15.5)
IMM GRANULOCYTES # BLD: 0 10*3/MM3 (ref 0–0.03)
IMM GRANULOCYTES NFR BLD: 0 % (ref 0–0.5)
LDLC SERPL CALC-MCNC: 81 MG/DL (ref 0–100)
LYMPHOCYTES # BLD AUTO: 1.66 10*3/MM3 (ref 0.9–4.8)
LYMPHOCYTES NFR BLD AUTO: 33.9 % (ref 19.6–45.3)
MCH RBC QN AUTO: 30.9 PG (ref 26.9–32)
MCHC RBC AUTO-ENTMCNC: 32.4 G/DL (ref 32.4–36.3)
MCV RBC AUTO: 95.3 FL (ref 80.5–98.2)
MONOCYTES # BLD AUTO: 0.43 10*3/MM3 (ref 0.2–1.2)
MONOCYTES NFR BLD AUTO: 8.8 % (ref 5–12)
NEUTROPHILS # BLD AUTO: 2.54 10*3/MM3 (ref 1.9–8.1)
NEUTROPHILS NFR BLD AUTO: 51.8 % (ref 42.7–76)
NRBC BLD AUTO-RTO: 0 /100 WBC (ref 0–0)
PLATELET # BLD AUTO: 237 10*3/MM3 (ref 140–500)
POTASSIUM SERPL-SCNC: 4.3 MMOL/L (ref 3.5–5.2)
PROT SERPL-MCNC: 6.4 G/DL (ref 6–8.5)
RBC # BLD AUTO: 4.27 10*6/MM3 (ref 3.9–5.2)
SODIUM SERPL-SCNC: 144 MMOL/L (ref 136–145)
TRIGL SERPL-MCNC: 50 MG/DL (ref 0–150)
TSH SERPL DL<=0.005 MIU/L-ACNC: 2.63 MIU/ML (ref 0.27–4.2)
UNABLE TO VOID: NORMAL
VLDLC SERPL CALC-MCNC: 10 MG/DL (ref 5–40)
WBC # BLD AUTO: 4.9 10*3/MM3 (ref 4.5–10.7)

## 2018-02-13 ENCOUNTER — OFFICE VISIT (OUTPATIENT)
Dept: INTERNAL MEDICINE | Facility: CLINIC | Age: 78
End: 2018-02-13

## 2018-02-13 VITALS
SYSTOLIC BLOOD PRESSURE: 120 MMHG | RESPIRATION RATE: 18 BRPM | HEART RATE: 82 BPM | BODY MASS INDEX: 22.82 KG/M2 | WEIGHT: 124 LBS | DIASTOLIC BLOOD PRESSURE: 70 MMHG | OXYGEN SATURATION: 99 % | HEIGHT: 62 IN

## 2018-02-13 DIAGNOSIS — L57.0 ACTINIC KERATOSIS: ICD-10-CM

## 2018-02-13 DIAGNOSIS — F32.A ANXIETY AND DEPRESSION: ICD-10-CM

## 2018-02-13 DIAGNOSIS — Z00.00 MEDICARE ANNUAL WELLNESS VISIT, SUBSEQUENT: Primary | ICD-10-CM

## 2018-02-13 DIAGNOSIS — Z23 NEED FOR INFLUENZA VACCINATION: ICD-10-CM

## 2018-02-13 DIAGNOSIS — F41.9 ANXIETY AND DEPRESSION: ICD-10-CM

## 2018-02-13 DIAGNOSIS — I10 ESSENTIAL HYPERTENSION: ICD-10-CM

## 2018-02-13 DIAGNOSIS — E78.2 MIXED HYPERLIPIDEMIA: ICD-10-CM

## 2018-02-13 PROCEDURE — G0439 PPPS, SUBSEQ VISIT: HCPCS | Performed by: INTERNAL MEDICINE

## 2018-02-13 PROCEDURE — 90662 IIV NO PRSV INCREASED AG IM: CPT | Performed by: INTERNAL MEDICINE

## 2018-02-13 PROCEDURE — G0008 ADMIN INFLUENZA VIRUS VAC: HCPCS | Performed by: INTERNAL MEDICINE

## 2018-02-13 PROCEDURE — 99214 OFFICE O/P EST MOD 30 MIN: CPT | Performed by: INTERNAL MEDICINE

## 2018-02-13 NOTE — PROGRESS NOTES
Medicare Annual Wellness Visit    Chief Complaint:  Annual Exam (AWV); Hypertension; and Hyperlipidemia      History of Present Illness    Coby Roland is a 77 y.o. female who presents for an Annual Wellness Visit. In addition, we addressed the following health issues:    Mammo:8/14/17  DEXA:4/12/17  C-scope:12/13/16  Flu shot: not yet.    Dental Exam: q4 mo  Eye Exam: annually  Exercise:  She does housework and shopping, but no dedicated exercise.      Advanced Care Planning:  has NO advance directive - not interested in additional information   Immunization History   Administered Date(s) Administered   • Flu Vaccine High Dose PF 65YR+ 02/13/2018   • Influenza, Unspecified 12/09/2013   • Pneumococcal Conjugate 13-Valent 01/01/2015   • Pneumococcal Polysaccharide 12/29/2015   • Tdap 06/16/2014     Coby is here for follow up on her HTN, HLD, Anxiety.  She takes xanax at bedtime each night.  She apparently got a letter from her ins that said they weren't going to cover any of her depression meds.  She is thinking she will taper off of xanax if they aren't going to cover it.  Her depression and anxiety are well controlled on her fluoxetine and wellbutrin.        Review of Systems   Constitution: Negative for chills, fever and malaise/fatigue.   HENT: Negative for hearing loss, hoarse voice and sore throat.    Eyes: Negative for blurred vision, double vision and visual disturbance.   Cardiovascular: Negative for chest pain, leg swelling and palpitations.   Respiratory: Negative for cough and shortness of breath.    Endocrine: Negative for polydipsia and polyuria.   Hematologic/Lymphatic: Does not bruise/bleed easily.   Skin: Negative for rash and suspicious lesions.   Musculoskeletal: Positive for joint pain (left hip area). Negative for arthritis and myalgias.   Gastrointestinal: Positive for constipation (just occ). Negative for bloating, abdominal pain, change in bowel habit, diarrhea, dysphagia, hematochezia,  melena, nausea and vomiting.   Genitourinary: Negative for dysuria and hematuria.   Neurological: Negative for dizziness, headaches and light-headedness.   Psychiatric/Behavioral: Negative for depression (well controlled.  ). The patient is not nervous/anxious (well controlled).        Past Medical History:   Diagnosis Date   • Allergic     Penicillin    • Arthritis    • Cataract    • Depression    • Hot flashes     with recent cessation of estrogen therapy   • Hyperlipidemia    • Hyperlipidemia    • Hypertension    • Migraine headache        Past Surgical History:   Procedure Laterality Date   • COLONOSCOPY  2010    Dr. Michaud   • COLONOSCOPY N/A 12/13/2016    Procedure: COLONOSCOPY to cecum and TI with cold polypectomy;  Surgeon: Mary Mac MD;  Location: [facility] ENDOSCOPY;  Service:    • DILATION AND CURETTAGE, DIAGNOSTIC / THERAPEUTIC     • HYSTERECTOMY     • OTHER SURGICAL HISTORY      cataract surgery   • TONSILLECTOMY         Social History     Social History   • Marital status:      Spouse name: N/A   • Number of children: N/A   • Years of education: N/A     Occupational History   • Not on file.     Social History Main Topics   • Smoking status: Never Smoker   • Smokeless tobacco: Never Used   • Alcohol use No   • Drug use: No   • Sexual activity: Defer     Other Topics Concern   • Not on file     Social History Narrative       Family History   Problem Relation Age of Onset   • Coronary artery disease Mother    • Other Father      cerebrovascular accident (cva)   • Coronary artery disease Father    • Coronary artery disease Brother      1 brother   • Diabetes Brother      type 2 - 2 brothers   • Prostate cancer Brother      2 brothers   • Crohn's disease Brother      1 brother   • Breast cancer Daughter        Allergies   Allergen Reactions   • Penicillins Swelling       Current Outpatient Prescriptions on File Prior to Visit   Medication Sig Dispense Refill   • ALPRAZolam (XANAX) 0.5 MG tablet  "TAKE ONE TABLET BY MOUTH ONCE NIGHTLY AS NEEDED FOR ANXIETY 90 tablet 0   • buPROPion SR (WELLBUTRIN SR) 200 MG 12 hr tablet TAKE ONE TABLET BY MOUTH TWICE A DAY 60 tablet 5   • FLUoxetine (PROzac) 20 MG capsule TAKE TWO CAPSULES BY MOUTH DAILY 60 capsule 2   • lovastatin (MEVACOR) 40 MG tablet TAKE ONE TABLET BY MOUTH ONCE NIGHTLY AT BEDTIME 90 tablet 2   • melatonin tablet Take by mouth. Take as directed     • meloxicam (MOBIC) 7.5 MG tablet Take 7.5 mg by mouth Daily.     • SUMAtriptan (IMITREX) 100 MG tablet TAKE ONE TABLET BY MOUTH AT ONSET OF MIGRAINE HEADACHE; MAY REPEAT ONE TABLET IN 2 HOURS AS NEEDED. MAX OF 200MG A DAY 9 tablet 3   • valsartan (DIOVAN) 40 MG tablet TAKE ONE TABLET BY MOUTH EVERY NIGHT AT BEDTIME 30 tablet 3     No current facility-administered medications on file prior to visit.        Patient Active Problem List   Diagnosis   • Abnormal chest x-ray   • Abnormal electrocardiogram   • Blood glucose abnormal   • Hearing loss   • Hoarseness   • Hyperlipidemia   • Hypertension   • Knee pain   • Onychomycosis of toenail   • Parkinsonian features   • Hyperhidrosis   • Tremor   • Anxiety and depression   • Migraine headache       Health Risk Assessment/Depression Screen/Functional and Cognitive Screening:   The patient has completed a Health Risk Assessment & Depression screen. These have been reviewed with them and have been scanned as a separate documents.    Age-appropriate Screening Schedule:  Refer to the list below for future screening recommendations based on patient's age. Orders for these recommended tests are listed in the plan section. The patient has been provided with a written plan.     I have reviewed their problem list, past medical history, family history, social history, and surgical history.     Vitals:    02/13/18 1255   BP: 120/70   Pulse: 82   Resp: 18   SpO2: 99%   Weight: 56.2 kg (124 lb)   Height: 157.5 cm (62\")       Body mass index is 22.68 kg/(m^2).    Physical Exam "   Constitutional: She is oriented to person, place, and time. She appears well-developed and well-nourished. No distress.   HENT:   Head: Normocephalic and atraumatic.   Nose: Nose normal.   Mouth/Throat: Oropharynx is clear and moist.   Eyes: Conjunctivae and EOM are normal. Pupils are equal, round, and reactive to light. No scleral icterus.   Neck: Normal range of motion. Neck supple. No thyromegaly present.   Cardiovascular: Normal rate, regular rhythm and normal heart sounds.  Exam reveals no gallop and no friction rub.    No murmur heard.  Pulses:       Carotid pulses are 2+ on the right side, and 2+ on the left side.       Femoral pulses are 2+ on the right side, and 2+ on the left side.       Dorsalis pedis pulses are 2+ on the right side, and 2+ on the left side.        Posterior tibial pulses are 2+ on the right side, and 2+ on the left side.   Pulmonary/Chest: Effort normal and breath sounds normal. No respiratory distress. She has no wheezes. She has no rales. Right breast exhibits no mass and no nipple discharge. Left breast exhibits no mass and no nipple discharge.   Abdominal: Soft. Bowel sounds are normal. She exhibits no distension and no mass. There is no tenderness.   Musculoskeletal: Normal range of motion. She exhibits no edema.       Vascular Status -  Her exam exhibits no right foot edema. Her exam exhibits no left foot edema.   Skin Integrity  -  Her right foot skin is intact.     Coby 's left foot skin is intact. .  Lymphadenopathy:     She has no cervical adenopathy.     She has no axillary adenopathy.        Right: No inguinal and no supraclavicular adenopathy present.        Left: No inguinal and no supraclavicular adenopathy present.   Neurological: She is alert and oriented to person, place, and time. She has normal reflexes. No cranial nerve deficit.   Skin: Skin is warm and dry. Lesion (AK on right temple) noted.   Psychiatric: She has a normal mood and affect. Her speech is normal  and behavior is normal. Judgment and thought content normal. Cognition and memory are normal.   Vitals reviewed.      Results for orders placed or performed in visit on 02/08/18   Comprehensive Metabolic Panel   Result Value Ref Range    Glucose 100 (H) 65 - 99 mg/dL    BUN 18 8 - 23 mg/dL    Creatinine 0.89 0.57 - 1.00 mg/dL    eGFR Non African Am 62 >60 mL/min/1.73    eGFR African Am 75 >60 mL/min/1.73    BUN/Creatinine Ratio 20.2 7.0 - 25.0    Sodium 144 136 - 145 mmol/L    Potassium 4.3 3.5 - 5.2 mmol/L    Chloride 104 98 - 107 mmol/L    Total CO2 32.7 (H) 22.0 - 29.0 mmol/L    Calcium 9.5 8.6 - 10.5 mg/dL    Total Protein 6.4 6.0 - 8.5 g/dL    Albumin 4.50 3.50 - 5.20 g/dL    Globulin 1.9 gm/dL    A/G Ratio 2.4 g/dL    Total Bilirubin 0.7 0.1 - 1.2 mg/dL    Alkaline Phosphatase 64 39 - 117 U/L    AST (SGOT) 22 1 - 32 U/L    ALT (SGPT) 15 1 - 33 U/L   Lipid Panel   Result Value Ref Range    Total Cholesterol 155 0 - 200 mg/dL    Triglycerides 50 0 - 150 mg/dL    HDL Cholesterol 64 (H) 40 - 60 mg/dL    VLDL Cholesterol 10 5 - 40 mg/dL    LDL Cholesterol  81 0 - 100 mg/dL   TSH Rfx On Abnormal To Free T4   Result Value Ref Range    TSH 2.63 0.27 - 4.2 mIU/mL   Hemoglobin A1c   Result Value Ref Range    Hemoglobin A1C 4.70 (L) 4.80 - 5.60 %   Unable To Void   Result Value Ref Range    Unable to Void Comment    CBC & Differential   Result Value Ref Range    WBC 4.90 4.50 - 10.70 10*3/mm3    RBC 4.27 3.90 - 5.20 10*6/mm3    Hemoglobin 13.2 11.9 - 15.5 g/dL    Hematocrit 40.7 35.6 - 45.5 %    MCV 95.3 80.5 - 98.2 fL    MCH 30.9 26.9 - 32.0 pg    MCHC 32.4 32.4 - 36.3 g/dL    RDW 13.8 (H) 11.7 - 13.0 %    Platelets 237 140 - 500 10*3/mm3    Neutrophil Rel % 51.8 42.7 - 76.0 %    Lymphocyte Rel % 33.9 19.6 - 45.3 %    Monocyte Rel % 8.8 5.0 - 12.0 %    Eosinophil Rel % 4.9 0.3 - 6.2 %    Basophil Rel % 0.6 0.0 - 1.5 %    Neutrophils Absolute 2.54 1.90 - 8.10 10*3/mm3    Lymphocytes Absolute 1.66 0.90 - 4.80 10*3/mm3     Monocytes Absolute 0.43 0.20 - 1.20 10*3/mm3    Eosinophils Absolute 0.24 0.00 - 0.70 10*3/mm3    Basophils Absolute 0.03 0.00 - 0.20 10*3/mm3    Immature Granulocyte Rel % 0.0 0.0 - 0.5 %    Immature Grans Absolute 0.00 0.00 - 0.03 10*3/mm3    nRBC 0.0 0.0 - 0.0 /100 WBC       Assessment & Plan:    Diagnoses and all orders for this visit:    Medicare annual wellness visit, subsequent    Essential hypertension  -     Urinalysis With / Culture If Indicated - Urine, Clean Catch  -     Comprehensive Metabolic Panel; Future    Mixed hyperlipidemia  -     Comprehensive Metabolic Panel; Future    Anxiety and depression    Need for influenza vaccination  -     Flu Vaccine High Dose PF 65YR+    Actinic keratosis  -     Ambulatory Referral to Dermatology        Discussion/Summary  Coby is here for her AWV and follow up.  She is a health 78 y/o who is up to date on health maintenance.  Flu shot today. Her bp is very well controlled.  Lipids are very well controlled.  Her anxiety and depression are well controlled on her current meds.  Continue all meds.  Narcotic agreement signed today for xanax.  She understands the risks of sedation.  She is going to try to come off of this due to lack of ins coverage.      Follow Up:  Return in about 6 months (around 8/13/2018) for Next scheduled follow up, with nonfasting labs prior.     An After Visit Summary and PPPS with all of these plans were given to the patient.

## 2018-02-15 LAB
APPEARANCE UR: CLEAR
BACTERIA #/AREA URNS HPF: NORMAL /HPF
BACTERIA UR CULT: NORMAL
BACTERIA UR CULT: NORMAL
BILIRUB UR QL STRIP: NEGATIVE
COLOR UR: YELLOW
EPI CELLS #/AREA URNS HPF: NORMAL /HPF
GLUCOSE UR QL: NEGATIVE
HGB UR QL STRIP: NEGATIVE
KETONES UR QL STRIP: NEGATIVE
LEUKOCYTE ESTERASE UR QL STRIP: ABNORMAL
MICRO URNS: ABNORMAL
NITRITE UR QL STRIP: NEGATIVE
PH UR STRIP: 6 [PH] (ref 5–7.5)
PROT UR QL STRIP: NEGATIVE
RBC #/AREA URNS HPF: NORMAL /HPF
SP GR UR: 1.01 (ref 1–1.03)
URINALYSIS REFLEX: ABNORMAL
UROBILINOGEN UR STRIP-MCNC: 0.2 MG/DL (ref 0.2–1)
WBC #/AREA URNS HPF: NORMAL /HPF

## 2018-04-02 RX ORDER — FLUOXETINE HYDROCHLORIDE 20 MG/1
CAPSULE ORAL
Qty: 60 CAPSULE | Refills: 1 | Status: SHIPPED | OUTPATIENT
Start: 2018-04-02 | End: 2018-06-11 | Stop reason: SDUPTHER

## 2018-04-02 RX ORDER — BUPROPION HYDROCHLORIDE 200 MG/1
TABLET, EXTENDED RELEASE ORAL
Qty: 60 TABLET | Refills: 4 | Status: SHIPPED | OUTPATIENT
Start: 2018-04-02 | End: 2018-09-05 | Stop reason: SDUPTHER

## 2018-05-14 RX ORDER — VALSARTAN 40 MG/1
TABLET ORAL
Qty: 30 TABLET | Refills: 2 | Status: SHIPPED | OUTPATIENT
Start: 2018-05-14 | End: 2018-08-10 | Stop reason: SDUPTHER

## 2018-06-11 RX ORDER — FLUOXETINE HYDROCHLORIDE 20 MG/1
CAPSULE ORAL
Qty: 60 CAPSULE | Refills: 3 | Status: SHIPPED | OUTPATIENT
Start: 2018-06-11 | End: 2018-10-15 | Stop reason: SDUPTHER

## 2018-07-17 ENCOUNTER — TELEPHONE (OUTPATIENT)
Dept: INTERNAL MEDICINE | Facility: CLINIC | Age: 78
End: 2018-07-17

## 2018-07-17 DIAGNOSIS — R20.0 NUMBNESS OF RIGHT HAND: Primary | ICD-10-CM

## 2018-07-17 NOTE — TELEPHONE ENCOUNTER
Pt was calling because she's still having numbness in her right hand everyday. She wants to know if she needs an appointment or if she could get a referral.

## 2018-08-10 RX ORDER — VALSARTAN 40 MG/1
TABLET ORAL
Qty: 30 TABLET | Refills: 1 | Status: SHIPPED | OUTPATIENT
Start: 2018-08-10 | End: 2018-08-13

## 2018-08-12 ENCOUNTER — RESULTS ENCOUNTER (OUTPATIENT)
Dept: INTERNAL MEDICINE | Facility: CLINIC | Age: 78
End: 2018-08-12

## 2018-08-12 DIAGNOSIS — E78.2 MIXED HYPERLIPIDEMIA: ICD-10-CM

## 2018-08-12 DIAGNOSIS — I10 ESSENTIAL HYPERTENSION: ICD-10-CM

## 2018-08-13 RX ORDER — LOSARTAN POTASSIUM 25 MG/1
25 TABLET ORAL DAILY
Qty: 90 TABLET | Refills: 3 | Status: SHIPPED | OUTPATIENT
Start: 2018-08-13

## 2018-08-30 LAB
ALBUMIN SERPL-MCNC: 4 G/DL (ref 3.5–5.2)
ALBUMIN/GLOB SERPL: 1.8 G/DL
ALP SERPL-CCNC: 62 U/L (ref 39–117)
ALT SERPL-CCNC: 20 U/L (ref 1–33)
AST SERPL-CCNC: 19 U/L (ref 1–32)
BILIRUB SERPL-MCNC: 0.4 MG/DL (ref 0.1–1.2)
BUN SERPL-MCNC: 21 MG/DL (ref 8–23)
BUN/CREAT SERPL: 22.1 (ref 7–25)
CALCIUM SERPL-MCNC: 9.3 MG/DL (ref 8.6–10.5)
CHLORIDE SERPL-SCNC: 103 MMOL/L (ref 98–107)
CO2 SERPL-SCNC: 28.1 MMOL/L (ref 22–29)
CREAT SERPL-MCNC: 0.95 MG/DL (ref 0.57–1)
GLOBULIN SER CALC-MCNC: 2.2 GM/DL
GLUCOSE SERPL-MCNC: 81 MG/DL (ref 65–99)
POTASSIUM SERPL-SCNC: 4.5 MMOL/L (ref 3.5–5.2)
PROT SERPL-MCNC: 6.2 G/DL (ref 6–8.5)
SODIUM SERPL-SCNC: 142 MMOL/L (ref 136–145)

## 2018-09-05 ENCOUNTER — OFFICE VISIT (OUTPATIENT)
Dept: INTERNAL MEDICINE | Facility: CLINIC | Age: 78
End: 2018-09-05

## 2018-09-05 VITALS
WEIGHT: 118 LBS | HEART RATE: 79 BPM | OXYGEN SATURATION: 96 % | SYSTOLIC BLOOD PRESSURE: 122 MMHG | BODY MASS INDEX: 21.71 KG/M2 | DIASTOLIC BLOOD PRESSURE: 60 MMHG | HEIGHT: 62 IN

## 2018-09-05 DIAGNOSIS — I10 ESSENTIAL HYPERTENSION: Primary | ICD-10-CM

## 2018-09-05 DIAGNOSIS — F32.A ANXIETY AND DEPRESSION: ICD-10-CM

## 2018-09-05 DIAGNOSIS — E78.2 MIXED HYPERLIPIDEMIA: ICD-10-CM

## 2018-09-05 DIAGNOSIS — L57.8 SOLAR DERMATITIS: ICD-10-CM

## 2018-09-05 DIAGNOSIS — F41.9 ANXIETY AND DEPRESSION: ICD-10-CM

## 2018-09-05 PROCEDURE — 99214 OFFICE O/P EST MOD 30 MIN: CPT | Performed by: INTERNAL MEDICINE

## 2018-09-05 RX ORDER — BUPROPION HYDROCHLORIDE 100 MG/1
100 TABLET, EXTENDED RELEASE ORAL 2 TIMES DAILY
Qty: 60 TABLET | Refills: 4 | Status: SHIPPED | OUTPATIENT
Start: 2018-09-05 | End: 2019-02-11 | Stop reason: SDUPTHER

## 2018-09-05 RX ORDER — METHYLPREDNISOLONE 4 MG/1
TABLET ORAL
Qty: 21 TABLET | Refills: 0 | Status: SHIPPED | OUTPATIENT
Start: 2018-09-05 | End: 2018-11-12

## 2018-09-05 NOTE — PROGRESS NOTES
Chief Complaint  Coby Roland is a 77 y.o. female who presents for Hypertension  .    History of Present Illness   Coby is here for routine follow up on her HTN, HLD, and depression/anxiety.  No palp or dizziness or soa or edema.  She got a sun burn on her shoulders two weeks ago and she still has a red raised itchy painful rash on both shoulders/upper arms.  She feels like the dep/anx are well controlled.  She is no longer taking xanax.  She would like to try to come off of some of her meds for depression/anxiety.      Review of Systems   Constitution: Negative for malaise/fatigue.   Cardiovascular: Positive for dyspnea on exertion (occ with cutting the grass). Negative for leg swelling and palpitations.   Respiratory: Negative for shortness of breath.    Skin: Positive for itching and rash.   Neurological: Negative for dizziness and light-headedness.       Patient Active Problem List   Diagnosis   • Abnormal chest x-ray   • Abnormal electrocardiogram   • Blood glucose abnormal   • Hearing loss   • Hoarseness   • Hyperlipidemia   • Hypertension   • Knee pain   • Onychomycosis of toenail   • Parkinsonian features   • Hyperhidrosis   • Tremor   • Anxiety and depression   • Migraine headache       Past Medical History:   Diagnosis Date   • Allergic     Penicillin    • Arthritis    • Cataract    • Depression    • Hot flashes     with recent cessation of estrogen therapy   • Hyperlipidemia    • Hyperlipidemia    • Hypertension    • Migraine headache        Past Surgical History:   Procedure Laterality Date   • COLONOSCOPY  2010    Dr. Michaud   • COLONOSCOPY N/A 12/13/2016    Procedure: COLONOSCOPY to cecum and TI with cold polypectomy;  Surgeon: Mary Mac MD;  Location: [facility] ENDOSCOPY;  Service:    • DILATION AND CURETTAGE, DIAGNOSTIC / THERAPEUTIC     • HYSTERECTOMY     • OTHER SURGICAL HISTORY      cataract surgery   • TONSILLECTOMY         Family History   Problem Relation Age of Onset   • Coronary  "artery disease Mother    • Other Father         cerebrovascular accident (cva)   • Coronary artery disease Father    • Coronary artery disease Brother         1 brother   • Diabetes Brother         type 2 - 2 brothers   • Prostate cancer Brother         2 brothers   • Crohn's disease Brother         1 brother   • Breast cancer Daughter        Social History     Social History   • Marital status:      Spouse name: N/A   • Number of children: N/A   • Years of education: N/A     Occupational History   • Not on file.     Social History Main Topics   • Smoking status: Never Smoker   • Smokeless tobacco: Never Used   • Alcohol use No   • Drug use: No   • Sexual activity: Defer     Other Topics Concern   • Not on file     Social History Narrative   • No narrative on file       Current Outpatient Prescriptions on File Prior to Visit   Medication Sig Dispense Refill   • FLUoxetine (PROzac) 20 MG capsule TAKE TWO CAPSULES BY MOUTH DAILY 60 capsule 3   • losartan (COZAAR) 25 MG tablet Take 1 tablet by mouth Daily. 90 tablet 3   • lovastatin (MEVACOR) 40 MG tablet TAKE ONE TABLET BY MOUTH ONCE NIGHTLY AT BEDTIME 90 tablet 2   • melatonin tablet Take by mouth. Take as directed     • SUMAtriptan (IMITREX) 100 MG tablet TAKE ONE TABLET BY MOUTH AT ONSET OF MIGRAINE HEADACHE; MAY REPEAT ONE TABLET IN 2 HOURS AS NEEDED. MAX OF 200MG A DAY 9 tablet 3   • [DISCONTINUED] buPROPion SR (WELLBUTRIN SR) 200 MG 12 hr tablet TAKE ONE TABLET BY MOUTH TWICE A DAY 60 tablet 4   • [DISCONTINUED] meloxicam (MOBIC) 7.5 MG tablet Take 7.5 mg by mouth Daily.     • [DISCONTINUED] ALPRAZolam (XANAX) 0.5 MG tablet TAKE ONE TABLET BY MOUTH ONCE NIGHTLY AS NEEDED FOR ANXIETY 90 tablet 0     No current facility-administered medications on file prior to visit.        Allergies   Allergen Reactions   • Penicillins Swelling       Vitals:    09/05/18 0811   BP: 122/60   Pulse: 79   SpO2: 96%   Weight: 53.5 kg (118 lb)   Height: 157.5 cm (62\") "       Body mass index is 21.58 kg/m².    Objective   Physical Exam   Constitutional: She is oriented to person, place, and time. She appears well-developed and well-nourished. No distress.   HENT:   Head: Normocephalic and atraumatic.   Eyes: Conjunctivae are normal. No scleral icterus.   Neck: Normal range of motion. Neck supple.   Cardiovascular: Normal rate, regular rhythm and normal heart sounds.  Exam reveals no gallop and no friction rub.    No murmur heard.  Pulmonary/Chest: Effort normal and breath sounds normal. No respiratory distress. She has no wheezes. She has no rales.   Musculoskeletal: She exhibits no edema.   Lymphadenopathy:     She has no cervical adenopathy.   Neurological: She is alert and oriented to person, place, and time. No cranial nerve deficit.   Skin: Rash (raised red urticarial rash on anterior shoulders) noted.   Psychiatric: She has a normal mood and affect. Her behavior is normal. Judgment and thought content normal.       Results for orders placed or performed in visit on 08/12/18   Comprehensive Metabolic Panel   Result Value Ref Range    Glucose 81 65 - 99 mg/dL    BUN 21 8 - 23 mg/dL    Creatinine 0.95 0.57 - 1.00 mg/dL    eGFR Non African Am 57 (L) >60 mL/min/1.73    eGFR African Am 69 >60 mL/min/1.73    BUN/Creatinine Ratio 22.1 7.0 - 25.0    Sodium 142 136 - 145 mmol/L    Potassium 4.5 3.5 - 5.2 mmol/L    Chloride 103 98 - 107 mmol/L    Total CO2 28.1 22.0 - 29.0 mmol/L    Calcium 9.3 8.6 - 10.5 mg/dL    Total Protein 6.2 6.0 - 8.5 g/dL    Albumin 4.00 3.50 - 5.20 g/dL    Globulin 2.2 gm/dL    A/G Ratio 1.8 g/dL    Total Bilirubin 0.4 0.1 - 1.2 mg/dL    Alkaline Phosphatase 62 39 - 117 U/L    AST (SGOT) 19 1 - 32 U/L    ALT (SGPT) 20 1 - 33 U/L       Assessment/Plan   Diagnoses and all orders for this visit:    Essential hypertension    Mixed hyperlipidemia    Anxiety and depression    Solar dermatitis  -     MethylPREDNISolone (MEDROL) 4 MG tablet; follow package  directions    Other orders  -     buPROPion SR (WELLBUTRIN SR) 100 MG 12 hr tablet; Take 1 tablet by mouth 2 (Two) Times a Day.        Discussion/Summary  Coby is here for routine follow up.  Her bp is controlled.  Labs look good.  We are going to try decreasing her wellbutrin from 200 bid to 100 bid.  Continue all other meds.  Continue to stay active.    No Follow-up on file.

## 2018-09-10 ENCOUNTER — LAB (OUTPATIENT)
Dept: LAB | Facility: HOSPITAL | Age: 78
End: 2018-09-10
Attending: PLASTIC SURGERY

## 2018-09-10 ENCOUNTER — TRANSCRIBE ORDERS (OUTPATIENT)
Dept: ADMINISTRATIVE | Facility: HOSPITAL | Age: 78
End: 2018-09-10

## 2018-09-10 ENCOUNTER — HOSPITAL ENCOUNTER (OUTPATIENT)
Dept: CARDIOLOGY | Facility: HOSPITAL | Age: 78
Discharge: HOME | End: 2018-09-10
Attending: PLASTIC SURGERY | Admitting: PLASTIC SURGERY

## 2018-09-10 DIAGNOSIS — Z01.818 PRE-OP EXAM: Primary | ICD-10-CM

## 2018-09-10 DIAGNOSIS — Z01.818 PRE-OP EXAM: ICD-10-CM

## 2018-09-10 LAB
ANION GAP SERPL CALCULATED.3IONS-SCNC: 9.8 MMOL/L
BUN BLD-MCNC: 26 MG/DL (ref 8–23)
BUN/CREAT SERPL: 30.2 (ref 7–25)
CALCIUM SPEC-SCNC: 10 MG/DL (ref 8.6–10.5)
CHLORIDE SERPL-SCNC: 102 MMOL/L (ref 98–107)
CO2 SERPL-SCNC: 30.2 MMOL/L (ref 22–29)
CREAT BLD-MCNC: 0.86 MG/DL (ref 0.57–1)
GFR SERPL CREATININE-BSD FRML MDRD: 64 ML/MIN/1.73
GLUCOSE BLD-MCNC: 101 MG/DL (ref 65–99)
POTASSIUM BLD-SCNC: 4.8 MMOL/L (ref 3.5–5.2)
SODIUM BLD-SCNC: 142 MMOL/L (ref 136–145)

## 2018-09-10 PROCEDURE — 93005 ELECTROCARDIOGRAM TRACING: CPT | Performed by: PLASTIC SURGERY

## 2018-09-10 PROCEDURE — 93010 ELECTROCARDIOGRAM REPORT: CPT | Performed by: INTERNAL MEDICINE

## 2018-09-10 PROCEDURE — 80048 BASIC METABOLIC PNL TOTAL CA: CPT

## 2018-09-10 PROCEDURE — 36415 COLL VENOUS BLD VENIPUNCTURE: CPT

## 2018-10-15 RX ORDER — FLUOXETINE HYDROCHLORIDE 20 MG/1
CAPSULE ORAL
Qty: 60 CAPSULE | Refills: 2 | Status: SHIPPED | OUTPATIENT
Start: 2018-10-15 | End: 2019-01-20 | Stop reason: SDUPTHER

## 2018-10-30 RX ORDER — LOVASTATIN 40 MG/1
TABLET ORAL
Qty: 90 TABLET | Refills: 1 | Status: SHIPPED | OUTPATIENT
Start: 2018-10-30 | End: 2019-04-29 | Stop reason: SDUPTHER

## 2018-11-12 ENCOUNTER — OFFICE VISIT (OUTPATIENT)
Dept: INTERNAL MEDICINE | Facility: CLINIC | Age: 78
End: 2018-11-12

## 2018-11-12 VITALS
DIASTOLIC BLOOD PRESSURE: 62 MMHG | HEART RATE: 79 BPM | RESPIRATION RATE: 18 BRPM | OXYGEN SATURATION: 98 % | BODY MASS INDEX: 22.63 KG/M2 | HEIGHT: 62 IN | SYSTOLIC BLOOD PRESSURE: 138 MMHG | WEIGHT: 123 LBS

## 2018-11-12 DIAGNOSIS — Z12.31 ENCOUNTER FOR SCREENING MAMMOGRAM FOR BREAST CANCER: ICD-10-CM

## 2018-11-12 DIAGNOSIS — R41.3 MEMORY CHANGES: Primary | ICD-10-CM

## 2018-11-12 DIAGNOSIS — M89.312: ICD-10-CM

## 2018-11-12 LAB
ALBUMIN SERPL-MCNC: 4.1 G/DL (ref 3.5–5.2)
ALBUMIN/GLOB SERPL: 1.8 G/DL
ALP SERPL-CCNC: 68 U/L (ref 39–117)
ALT SERPL-CCNC: 18 U/L (ref 1–33)
AST SERPL-CCNC: 19 U/L (ref 1–32)
BILIRUB SERPL-MCNC: 0.4 MG/DL (ref 0.1–1.2)
BUN SERPL-MCNC: 19 MG/DL (ref 8–23)
BUN/CREAT SERPL: 20.9 (ref 7–25)
CALCIUM SERPL-MCNC: 9.4 MG/DL (ref 8.6–10.5)
CHLORIDE SERPL-SCNC: 104 MMOL/L (ref 98–107)
CO2 SERPL-SCNC: 28.2 MMOL/L (ref 22–29)
CREAT SERPL-MCNC: 0.91 MG/DL (ref 0.57–1)
GLOBULIN SER CALC-MCNC: 2.3 GM/DL
GLUCOSE SERPL-MCNC: 87 MG/DL (ref 65–99)
POTASSIUM SERPL-SCNC: 4.3 MMOL/L (ref 3.5–5.2)
PROT SERPL-MCNC: 6.4 G/DL (ref 6–8.5)
SODIUM SERPL-SCNC: 142 MMOL/L (ref 136–145)
TSH SERPL DL<=0.005 MIU/L-ACNC: 1.9 MIU/ML (ref 0.27–4.2)
VIT B12 SERPL-MCNC: 687 PG/ML (ref 211–946)

## 2018-11-12 PROCEDURE — 73030 X-RAY EXAM OF SHOULDER: CPT | Performed by: INTERNAL MEDICINE

## 2018-11-12 PROCEDURE — 99214 OFFICE O/P EST MOD 30 MIN: CPT | Performed by: INTERNAL MEDICINE

## 2018-11-12 NOTE — PROGRESS NOTES
Chief Complaint  Coby Roland is a 77 y.o. female who presents for Shoulder Problem (Left shoulder, started late summer, denies pain, but increasing in size. ) and Memory Loss (Can't think of words/when she goes somewhere she has to think of how to get there. )  .    History of Present Illness   Coby is here for acute care for two new issues.  She has a bony growth on her shoulder that she has noticed since this summer.  It seems to be getting larger.  Nontender.      She feels like her memory is getting worse.  She is having a hard time with names and how to get places.   She hasn't gotten lost driving.  She hasn't forgotten how to use appliances.  No headaches.      Review of Systems   Musculoskeletal: Negative for joint pain, joint swelling and myalgias.   Neurological: Negative for dizziness, headaches and light-headedness.   Psychiatric/Behavioral: Positive for memory loss. Negative for altered mental status.       Patient Active Problem List   Diagnosis   • Abnormal chest x-ray   • Abnormal electrocardiogram   • Blood glucose abnormal   • Hearing loss   • Hoarseness   • Hyperlipidemia   • Hypertension   • Knee pain   • Onychomycosis of toenail   • Parkinsonian features   • Hyperhidrosis   • Tremor   • Anxiety and depression   • Migraine headache       Past Medical History:   Diagnosis Date   • Allergic     Penicillin    • Arthritis    • Cataract    • Depression    • Hot flashes     with recent cessation of estrogen therapy   • Hyperlipidemia    • Hyperlipidemia    • Hypertension    • Migraine headache        Past Surgical History:   Procedure Laterality Date   • COLONOSCOPY  2010    Dr. Michaud   • DILATION AND CURETTAGE, DIAGNOSTIC / THERAPEUTIC     • HYSTERECTOMY     • OTHER SURGICAL HISTORY      cataract surgery   • TONSILLECTOMY         Family History   Problem Relation Age of Onset   • Coronary artery disease Mother    • Other Father         cerebrovascular accident (cva)   • Coronary artery disease  Father    • Coronary artery disease Brother         1 brother   • Diabetes Brother         type 2 - 2 brothers   • Prostate cancer Brother         2 brothers   • Crohn's disease Brother         1 brother   • Breast cancer Daughter        Social History     Socioeconomic History   • Marital status:      Spouse name: Not on file   • Number of children: Not on file   • Years of education: Not on file   • Highest education level: Not on file   Social Needs   • Financial resource strain: Not on file   • Food insecurity - worry: Not on file   • Food insecurity - inability: Not on file   • Transportation needs - medical: Not on file   • Transportation needs - non-medical: Not on file   Occupational History   • Not on file   Tobacco Use   • Smoking status: Never Smoker   • Smokeless tobacco: Never Used   Substance and Sexual Activity   • Alcohol use: No   • Drug use: No   • Sexual activity: Defer   Other Topics Concern   • Not on file   Social History Narrative   • Not on file       Current Outpatient Medications on File Prior to Visit   Medication Sig Dispense Refill   • buPROPion SR (WELLBUTRIN SR) 100 MG 12 hr tablet Take 1 tablet by mouth 2 (Two) Times a Day. 60 tablet 4   • FLUoxetine (PROzac) 20 MG capsule TAKE TWO CAPSULES BY MOUTH DAILY 60 capsule 2   • losartan (COZAAR) 25 MG tablet Take 1 tablet by mouth Daily. 90 tablet 3   • lovastatin (MEVACOR) 40 MG tablet TAKE ONE TABLET BY MOUTH EVERY NIGHT AT BEDTIME 90 tablet 1   • melatonin tablet Take by mouth. Take as directed     • SUMAtriptan (IMITREX) 100 MG tablet TAKE ONE TABLET BY MOUTH AT ONSET OF MIGRAINE HEADACHE; MAY REPEAT ONE TABLET IN 2 HOURS AS NEEDED. MAX OF 200MG A DAY 9 tablet 3   • [DISCONTINUED] MethylPREDNISolone (MEDROL) 4 MG tablet follow package directions 21 tablet 0     No current facility-administered medications on file prior to visit.        Allergies   Allergen Reactions   • Penicillins Swelling       Vitals:    11/12/18 1319   BP:  "138/62   Pulse: 79   Resp: 18   SpO2: 98%   Weight: 55.8 kg (123 lb)   Height: 157.5 cm (62.01\")       Body mass index is 22.49 kg/m².    Objective   Physical Exam   Constitutional: She is oriented to person, place, and time. She appears well-developed and well-nourished. No distress.   HENT:   Head: Normocephalic and atraumatic.   Eyes: Conjunctivae are normal. No scleral icterus.   Neck: Normal range of motion. Neck supple.   Pulmonary/Chest: Effort normal. No respiratory distress.   Musculoskeletal:        Left shoulder: She exhibits normal range of motion, no tenderness, no bony tenderness and no swelling.        Arms:  Lymphadenopathy:     She has no cervical adenopathy.   Neurological: She is alert and oriented to person, place, and time. No cranial nerve deficit.   Psychiatric: She has a normal mood and affect. Her speech is normal and behavior is normal. Judgment and thought content normal.   MMSE 29/30       Results for orders placed or performed in visit on 09/10/18   Basic Metabolic Panel   Result Value Ref Range    Glucose 101 (H) 65 - 99 mg/dL    BUN 26 (H) 8 - 23 mg/dL    Creatinine 0.86 0.57 - 1.00 mg/dL    Sodium 142 136 - 145 mmol/L    Potassium 4.8 3.5 - 5.2 mmol/L    Chloride 102 98 - 107 mmol/L    CO2 30.2 (H) 22.0 - 29.0 mmol/L    Calcium 10.0 8.6 - 10.5 mg/dL    eGFR Non African Amer 64 >60 mL/min/1.73    BUN/Creatinine Ratio 30.2 (H) 7.0 - 25.0    Anion Gap 9.8 mmol/L     XR left shoulder 2 v  Reason for exam: bone growth  Comparison: none  Impression: normal      Assessment/Plan   Diagnoses and all orders for this visit:    Memory changes  -     Comprehensive Metabolic Panel  -     TSH  -     Vitamin B12    Hypertrophy of bone of shoulder, left  -     XR Shoulder 2+ View Left (In Office)    Encounter for screening mammogram for breast cancer  -     Mammo Screening Bilateral With CAD; Future        Discussion/Summary  Coby is here for acute care.  She scored really well on her MMSE.  She " may have some mild cognitive impairment but overall her memory issues appear mild.  For now, we are going to monitor her memory.  Encouraged to look into some brain training games on [employer].  If her memory worsens acutely, we can do further work up.  Will check a few basic labs today.    No Follow-up on file.

## 2018-12-17 ENCOUNTER — HOSPITAL ENCOUNTER (OUTPATIENT)
Dept: MAMMOGRAPHY | Facility: HOSPITAL | Age: 78
Discharge: HOME | End: 2018-12-17
Admitting: INTERNAL MEDICINE

## 2018-12-17 DIAGNOSIS — Z12.31 ENCOUNTER FOR SCREENING MAMMOGRAM FOR BREAST CANCER: ICD-10-CM

## 2018-12-17 PROCEDURE — 77067 SCR MAMMO BI INCL CAD: CPT

## 2018-12-27 RX ORDER — SUMATRIPTAN 100 MG/1
TABLET, FILM COATED ORAL
Qty: 9 TABLET | Refills: 2 | Status: SHIPPED | OUTPATIENT
Start: 2018-12-27

## 2019-01-21 RX ORDER — FLUOXETINE HYDROCHLORIDE 20 MG/1
CAPSULE ORAL
Qty: 60 CAPSULE | Refills: 1 | Status: SHIPPED | OUTPATIENT
Start: 2019-01-21 | End: 2019-03-18 | Stop reason: SDUPTHER

## 2019-02-11 RX ORDER — BUPROPION HYDROCHLORIDE 100 MG/1
TABLET, EXTENDED RELEASE ORAL
Qty: 60 TABLET | Refills: 3 | Status: SHIPPED | OUTPATIENT
Start: 2019-02-11 | End: 2019-06-17 | Stop reason: SDUPTHER

## 2019-03-18 RX ORDER — FLUOXETINE HYDROCHLORIDE 20 MG/1
CAPSULE ORAL
Qty: 60 CAPSULE | Refills: 3 | Status: SHIPPED | OUTPATIENT
Start: 2019-03-18 | End: 2019-07-23 | Stop reason: SDUPTHER

## 2019-04-19 DIAGNOSIS — E78.2 MIXED HYPERLIPIDEMIA: ICD-10-CM

## 2019-04-19 DIAGNOSIS — Z00.00 HEALTH CARE MAINTENANCE: Primary | ICD-10-CM

## 2019-04-19 DIAGNOSIS — I10 ESSENTIAL HYPERTENSION: ICD-10-CM

## 2019-04-19 DIAGNOSIS — R73.09 BLOOD GLUCOSE ABNORMAL: ICD-10-CM

## 2019-04-26 LAB
ALBUMIN SERPL-MCNC: 4.2 G/DL (ref 3.5–5.2)
ALBUMIN/GLOB SERPL: 2.1 G/DL
ALP SERPL-CCNC: 68 U/L (ref 39–117)
ALT SERPL-CCNC: 26 U/L (ref 1–33)
APPEARANCE UR: CLEAR
AST SERPL-CCNC: 29 U/L (ref 1–32)
BACTERIA #/AREA URNS HPF: ABNORMAL /HPF
BASOPHILS # BLD AUTO: 0.05 10*3/MM3 (ref 0–0.2)
BASOPHILS NFR BLD AUTO: 1.1 % (ref 0–1.5)
BILIRUB SERPL-MCNC: 0.5 MG/DL (ref 0.2–1.2)
BILIRUB UR QL STRIP: NEGATIVE
BUN SERPL-MCNC: 19 MG/DL (ref 8–23)
BUN/CREAT SERPL: 22.9 (ref 7–25)
CALCIUM SERPL-MCNC: 9.2 MG/DL (ref 8.6–10.5)
CHLORIDE SERPL-SCNC: 103 MMOL/L (ref 98–107)
CHOLEST SERPL-MCNC: 134 MG/DL (ref 0–200)
CO2 SERPL-SCNC: 28.4 MMOL/L (ref 22–29)
COLOR UR: YELLOW
CREAT SERPL-MCNC: 0.83 MG/DL (ref 0.57–1)
CRYSTALS URNS MICRO: ABNORMAL
EOSINOPHIL # BLD AUTO: 0.25 10*3/MM3 (ref 0–0.4)
EOSINOPHIL NFR BLD AUTO: 5.6 % (ref 0.3–6.2)
EPI CELLS #/AREA URNS HPF: ABNORMAL /HPF
ERYTHROCYTE [DISTWIDTH] IN BLOOD BY AUTOMATED COUNT: 13.8 % (ref 12.3–15.4)
GLOBULIN SER CALC-MCNC: 2 GM/DL
GLUCOSE SERPL-MCNC: 100 MG/DL (ref 65–99)
GLUCOSE UR QL: NEGATIVE
HBA1C MFR BLD: 4.9 % (ref 4.8–5.6)
HCT VFR BLD AUTO: 38.5 % (ref 34–46.6)
HDLC SERPL-MCNC: 62 MG/DL (ref 40–60)
HGB BLD-MCNC: 12.1 G/DL (ref 12–15.9)
HGB UR QL STRIP: NEGATIVE
IMM GRANULOCYTES # BLD AUTO: 0.01 10*3/MM3 (ref 0–0.05)
IMM GRANULOCYTES NFR BLD AUTO: 0.2 % (ref 0–0.5)
KETONES UR QL STRIP: NEGATIVE
LDLC SERPL CALC-MCNC: 66 MG/DL (ref 0–100)
LEUKOCYTE ESTERASE UR QL STRIP: NEGATIVE
LYMPHOCYTES # BLD AUTO: 1.08 10*3/MM3 (ref 0.7–3.1)
LYMPHOCYTES NFR BLD AUTO: 24.3 % (ref 19.6–45.3)
MCH RBC QN AUTO: 30.2 PG (ref 26.6–33)
MCHC RBC AUTO-ENTMCNC: 31.4 G/DL (ref 31.5–35.7)
MCV RBC AUTO: 96 FL (ref 79–97)
MICRO URNS: NORMAL
MICRO URNS: NORMAL
MONOCYTES # BLD AUTO: 0.48 10*3/MM3 (ref 0.1–0.9)
MONOCYTES NFR BLD AUTO: 10.8 % (ref 5–12)
MUCOUS THREADS URNS QL MICRO: PRESENT /HPF
NEUTROPHILS # BLD AUTO: 2.57 10*3/MM3 (ref 1.7–7)
NEUTROPHILS NFR BLD AUTO: 58 % (ref 42.7–76)
NITRITE UR QL STRIP: NEGATIVE
NRBC BLD AUTO-RTO: 0 /100 WBC (ref 0–0.2)
PH UR STRIP: 6 [PH] (ref 5–7.5)
PLATELET # BLD AUTO: 229 10*3/MM3 (ref 140–450)
POTASSIUM SERPL-SCNC: 4.3 MMOL/L (ref 3.5–5.2)
PROT SERPL-MCNC: 6.2 G/DL (ref 6–8.5)
PROT UR QL STRIP: NEGATIVE
RBC # BLD AUTO: 4.01 10*6/MM3 (ref 3.77–5.28)
RBC #/AREA URNS HPF: ABNORMAL /HPF
SODIUM SERPL-SCNC: 140 MMOL/L (ref 136–145)
SP GR UR: 1.02 (ref 1–1.03)
TRIGL SERPL-MCNC: 32 MG/DL (ref 0–150)
TSH SERPL DL<=0.005 MIU/L-ACNC: 1.17 MIU/ML (ref 0.27–4.2)
UNIDENT CRYS URNS QL MICRO: PRESENT /LPF
URINALYSIS REFLEX: NORMAL
UROBILINOGEN UR STRIP-MCNC: 0.2 MG/DL (ref 0.2–1)
VLDLC SERPL CALC-MCNC: 6.4 MG/DL
WBC # BLD AUTO: 4.44 10*3/MM3 (ref 3.4–10.8)
WBC #/AREA URNS HPF: ABNORMAL /HPF

## 2019-04-29 ENCOUNTER — OFFICE VISIT (OUTPATIENT)
Dept: INTERNAL MEDICINE | Facility: CLINIC | Age: 79
End: 2019-04-29

## 2019-04-29 VITALS
SYSTOLIC BLOOD PRESSURE: 132 MMHG | OXYGEN SATURATION: 98 % | BODY MASS INDEX: 23 KG/M2 | DIASTOLIC BLOOD PRESSURE: 76 MMHG | HEART RATE: 61 BPM | HEIGHT: 62 IN | TEMPERATURE: 97.6 F | WEIGHT: 125 LBS

## 2019-04-29 DIAGNOSIS — F41.9 ANXIETY AND DEPRESSION: ICD-10-CM

## 2019-04-29 DIAGNOSIS — F32.A ANXIETY AND DEPRESSION: ICD-10-CM

## 2019-04-29 DIAGNOSIS — Z00.00 MEDICARE ANNUAL WELLNESS VISIT, SUBSEQUENT: Primary | ICD-10-CM

## 2019-04-29 DIAGNOSIS — I10 ESSENTIAL HYPERTENSION: ICD-10-CM

## 2019-04-29 DIAGNOSIS — R07.89 CHEST PRESSURE: ICD-10-CM

## 2019-04-29 DIAGNOSIS — R06.09 DOE (DYSPNEA ON EXERTION): ICD-10-CM

## 2019-04-29 DIAGNOSIS — E78.2 MIXED HYPERLIPIDEMIA: ICD-10-CM

## 2019-04-29 PROCEDURE — 93000 ELECTROCARDIOGRAM COMPLETE: CPT | Performed by: INTERNAL MEDICINE

## 2019-04-29 PROCEDURE — 99214 OFFICE O/P EST MOD 30 MIN: CPT | Performed by: INTERNAL MEDICINE

## 2019-04-29 PROCEDURE — G0439 PPPS, SUBSEQ VISIT: HCPCS | Performed by: INTERNAL MEDICINE

## 2019-04-29 PROCEDURE — 71046 X-RAY EXAM CHEST 2 VIEWS: CPT | Performed by: INTERNAL MEDICINE

## 2019-04-29 NOTE — PROGRESS NOTES
Medicare Annual Wellness Visit    Chief Complaint:  Annual Exam (AWV); Hyperlipidemia; Hypertension; Anxiety; and Depression      History of Present Illness    Coby Roland is a 78 y.o. female who presents for an Annual Wellness Visit. In addition, we addressed the following health issues:    Mammo:12/17/18   DEXA:5/12/17  C-scope:12/13/16 due 5 years  Dental Exam: q6mo  Eye Exam: yearly  Exercise:  Grass cutting; staying active around the house.      Advanced Care Planning:  Patient has an advance directive - a copy has not been provided. Have asked the patient to send this to us to add to record   Immunization History   Administered Date(s) Administered   • Flu Vaccine High Dose PF 65YR+ 02/13/2018, 10/15/2018   • Influenza, Unspecified 12/09/2013   • Pneumococcal Conjugate 13-Valent (PCV13) 01/01/2015   • Pneumococcal Polysaccharide (PPSV23) 12/29/2015   • Td, Not Adsorbed 04/29/2017   • Tdap 06/16/2014     Coby is here for follow up on her HTN, HLD, anxiety and depression.  She had an episode in February where she felt like she could pass out.  She was breathing heavy.  It happened after she had been serving lunch for about an hour.  It hasn't happened since then. She has had increasing JERNIGAN with stairs and cutting the grass over the last year.  She has had a couple of episodes of chest pressure.  No other near syncope.   She feels like her anxiety and depression are well controlled.     Review of Systems   Constitution: Negative for chills, fever and malaise/fatigue.   HENT: Positive for hoarse voice (comes and goes.  ). Negative for hearing loss and sore throat.    Eyes: Negative for blurred vision, double vision and visual disturbance.   Cardiovascular: Positive for dyspnea on exertion (with steps and cutting grass). Negative for chest pain (she has had pressure a couple of times), leg swelling and palpitations.   Respiratory: Negative for cough and shortness of breath.    Endocrine: Negative for  polydipsia and polyuria.   Hematologic/Lymphatic: Does not bruise/bleed easily.   Skin: Negative for rash and suspicious lesions.   Musculoskeletal: Negative for arthritis and myalgias.   Gastrointestinal: Negative for bloating, abdominal pain, change in bowel habit, constipation, diarrhea, dysphagia, hematochezia, melena, nausea and vomiting.   Genitourinary: Negative for dysuria and hematuria.   Neurological: Negative for dizziness, headaches and light-headedness.   Psychiatric/Behavioral: Negative for depression. The patient is not nervous/anxious.        Past Medical History:   Diagnosis Date   • Allergic     Penicillin    • Arthritis    • Cataract    • Depression    • Hot flashes     with recent cessation of estrogen therapy   • Hyperlipidemia    • Hyperlipidemia    • Hypertension    • Migraine headache        Past Surgical History:   Procedure Laterality Date   • COLONOSCOPY  2010    Dr. Michaud   • COLONOSCOPY N/A 12/13/2016    Procedure: COLONOSCOPY to cecum and TI with cold polypectomy;  Surgeon: Mary Mac MD;  Location: [facility] ENDOSCOPY;  Service:    • DILATION AND CURETTAGE, DIAGNOSTIC / THERAPEUTIC     • HYSTERECTOMY     • OTHER SURGICAL HISTORY      cataract surgery   • TONSILLECTOMY         Social History     Socioeconomic History   • Marital status:      Spouse name: Not on file   • Number of children: Not on file   • Years of education: Not on file   • Highest education level: Not on file   Tobacco Use   • Smoking status: Never Smoker   • Smokeless tobacco: Never Used   Substance and Sexual Activity   • Alcohol use: No   • Drug use: No   • Sexual activity: Defer       Family History   Problem Relation Age of Onset   • Coronary artery disease Mother    • Other Father         cerebrovascular accident (cva)   • Coronary artery disease Father    • Coronary artery disease Brother         1 brother   • Diabetes Brother         type 2 - 2 brothers   • Prostate cancer Brother         2 brothers    • Crohn's disease Brother         1 brother   • Breast cancer Daughter        Allergies   Allergen Reactions   • Penicillins Swelling       Current Outpatient Medications on File Prior to Visit   Medication Sig Dispense Refill   • buPROPion SR (WELLBUTRIN SR) 100 MG 12 hr tablet TAKE ONE TABLET BY MOUTH TWICE A DAY 60 tablet 3   • FLUoxetine (PROzac) 20 MG capsule TAKE TWO CAPSULES BY MOUTH DAILY 60 capsule 3   • losartan (COZAAR) 25 MG tablet Take 1 tablet by mouth Daily. 90 tablet 3   • lovastatin (MEVACOR) 40 MG tablet TAKE ONE TABLET BY MOUTH EVERY NIGHT AT BEDTIME 90 tablet 1   • melatonin tablet Take by mouth. Take as directed     • SUMAtriptan (IMITREX) 100 MG tablet TAKE ONE TABLET BY MOUTH AT ONSET OF HEADACHE; MAY REPEAT ONE TABLET IN 2 HOURS AS NEEDED. 9 tablet 2     No current facility-administered medications on file prior to visit.        Patient Active Problem List   Diagnosis   • Abnormal chest x-ray   • Abnormal electrocardiogram   • Blood glucose abnormal   • Hearing loss   • Hoarseness   • Hyperlipidemia   • Hypertension   • Knee pain   • Onychomycosis of toenail   • Parkinsonian features   • Hyperhidrosis   • Tremor   • Anxiety and depression   • Migraine headache       Health Risk Assessment/Depression Screen/Functional and Cognitive Screening:   The patient has completed a Health Risk Assessment & Depression screen. These have been reviewed with them and have been scanned as a separate documents.    Age-appropriate Screening Schedule:  Refer to the list below for future screening recommendations based on patient's age. Orders for these recommended tests are listed in the plan section. The patient has been provided with a written plan.     I have reviewed their problem list, past medical history, family history, social history, and surgical history.     Vitals:    04/29/19 1056   BP: 132/76   BP Location: Left arm   Patient Position: Sitting   Cuff Size: Adult   Pulse: 61   Temp: 97.6 °F (36.4  "°C)   TempSrc: Oral   SpO2: 98%   Weight: 56.7 kg (125 lb)   Height: 157.5 cm (62\")       Body mass index is 22.86 kg/m².    Physical Exam   Constitutional: She is oriented to person, place, and time. She appears well-developed and well-nourished. No distress.   HENT:   Head: Normocephalic and atraumatic.   Nose: Nose normal.   Mouth/Throat: Oropharynx is clear and moist.   Eyes: Conjunctivae and EOM are normal. Pupils are equal, round, and reactive to light. No scleral icterus.   Neck: Normal range of motion. Neck supple. No thyromegaly present.   Cardiovascular: Normal rate, regular rhythm and normal heart sounds. Exam reveals no gallop and no friction rub.   No murmur heard.  Pulses:       Carotid pulses are 2+ on the right side, and 2+ on the left side.       Femoral pulses are 2+ on the right side, and 2+ on the left side.       Dorsalis pedis pulses are 2+ on the right side, and 2+ on the left side.        Posterior tibial pulses are 2+ on the right side, and 2+ on the left side.   Pulmonary/Chest: Effort normal and breath sounds normal. No respiratory distress. She has no wheezes. She has no rales. Right breast exhibits no mass and no nipple discharge. Left breast exhibits no mass and no nipple discharge.   Abdominal: Soft. Bowel sounds are normal. She exhibits no distension and no mass. There is no tenderness.   Musculoskeletal: Normal range of motion. She exhibits no edema.     Vascular Status -  Her right foot exhibits no edema. Her left foot exhibits no edema.  Skin Integrity  -  Her right foot skin is intact.Her left foot skin is intact..  Lymphadenopathy:     She has no cervical adenopathy.     She has no axillary adenopathy.        Right: No inguinal and no supraclavicular adenopathy present.        Left: No inguinal and no supraclavicular adenopathy present.   Neurological: She is alert and oriented to person, place, and time. She has normal reflexes. No cranial nerve deficit.   Skin: Skin is warm and " dry.   Psychiatric: She has a normal mood and affect. Her speech is normal and behavior is normal. Judgment and thought content normal. Cognition and memory are normal.   Vitals reviewed.      Results for orders placed or performed in visit on 04/19/19   Comprehensive Metabolic Panel   Result Value Ref Range    Glucose 100 (H) 65 - 99 mg/dL    BUN 19 8 - 23 mg/dL    Creatinine 0.83 0.57 - 1.00 mg/dL    eGFR Non African Am 67 >60 mL/min/1.73    eGFR African Am 81 >60 mL/min/1.73    BUN/Creatinine Ratio 22.9 7.0 - 25.0    Sodium 140 136 - 145 mmol/L    Potassium 4.3 3.5 - 5.2 mmol/L    Chloride 103 98 - 107 mmol/L    Total CO2 28.4 22.0 - 29.0 mmol/L    Calcium 9.2 8.6 - 10.5 mg/dL    Total Protein 6.2 6.0 - 8.5 g/dL    Albumin 4.20 3.50 - 5.20 g/dL    Globulin 2.0 gm/dL    A/G Ratio 2.1 g/dL    Total Bilirubin 0.5 0.2 - 1.2 mg/dL    Alkaline Phosphatase 68 39 - 117 U/L    AST (SGOT) 29 1 - 32 U/L    ALT (SGPT) 26 1 - 33 U/L   Lipid Panel   Result Value Ref Range    Total Cholesterol 134 0 - 200 mg/dL    Triglycerides 32 0 - 150 mg/dL    HDL Cholesterol 62 (H) 40 - 60 mg/dL    VLDL Cholesterol 6.4 mg/dL    LDL Cholesterol  66 0 - 100 mg/dL   TSH Rfx On Abnormal To Free T4   Result Value Ref Range    TSH 1.170 0.270 - 4.200 mIU/mL   Hemoglobin A1c   Result Value Ref Range    Hemoglobin A1C 4.90 4.80 - 5.60 %   Urinalysis With Culture If Indicated - Urine, Clean Catch   Result Value Ref Range    Specific Gravity, UA 1.020 1.005 - 1.030    pH, UA 6.0 5.0 - 7.5    Color, UA Yellow Yellow    Appearance, UA Clear Clear    Leukocytes, UA Negative Negative    Protein Negative Negative/Trace    Glucose, UA Negative Negative    Ketones Negative Negative    Blood, UA Negative Negative    Bilirubin, UA Negative Negative    Urobilinogen, UA 0.2 0.2 - 1.0 mg/dL    Nitrite, UA Negative Negative    Microscopic Examination Comment     Microscopic Examination See below:     Urinalysis Reflex Comment    Microscopic Examination -    Result Value Ref Range    WBC, UA 0-5 0 - 5 /hpf    RBC, UA None seen 0 - 2 /hpf    Epithelial Cells (non renal) 0-10 0 - 10 /hpf    Crystals, UA Present (A) N/A /lpf    Crystal Type Calcium Oxalate N/A    Mucus, UA Present Not Estab. /hpf    Bacteria, UA Few None seen/Few /hpf   CBC & Differential   Result Value Ref Range    WBC 4.44 3.40 - 10.80 10*3/mm3    RBC 4.01 3.77 - 5.28 10*6/mm3    Hemoglobin 12.1 12.0 - 15.9 g/dL    Hematocrit 38.5 34.0 - 46.6 %    MCV 96.0 79.0 - 97.0 fL    MCH 30.2 26.6 - 33.0 pg    MCHC 31.4 (L) 31.5 - 35.7 g/dL    RDW 13.8 12.3 - 15.4 %    Platelets 229 140 - 450 10*3/mm3    Neutrophil Rel % 58.0 42.7 - 76.0 %    Lymphocyte Rel % 24.3 19.6 - 45.3 %    Monocyte Rel % 10.8 5.0 - 12.0 %    Eosinophil Rel % 5.6 0.3 - 6.2 %    Basophil Rel % 1.1 0.0 - 1.5 %    Neutrophils Absolute 2.57 1.70 - 7.00 10*3/mm3    Lymphocytes Absolute 1.08 0.70 - 3.10 10*3/mm3    Monocytes Absolute 0.48 0.10 - 0.90 10*3/mm3    Eosinophils Absolute 0.25 0.00 - 0.40 10*3/mm3    Basophils Absolute 0.05 0.00 - 0.20 10*3/mm3    Immature Granulocyte Rel % 0.2 0.0 - 0.5 %    Immature Grans Absolute 0.01 0.00 - 0.05 10*3/mm3    nRBC 0.0 0.0 - 0.2 /100 WBC     CXR PA & Lat  Reason for exam: JERNIGAN, chest pressure  Film for comparison:  none  Results: NAD      ECG 12 Lead  Date/Time: 4/29/2019 11:35 AM  Performed by: Marie Ramos MD  Authorized by: Marie Ramos MD   Comparison: compared with previous ECG from 9/10/2018  Similar to previous ECG  Rhythm: sinus rhythm  Rate: normal  Conduction: conduction normal  ST Segments: ST segments normal  T Waves: T waves normal  QRS axis: normal  Other: no other findings    Clinical impression: normal ECG  Comments: Reason for exam: JERNIGAN and chest pressure            Assessment & Plan:    Diagnoses and all orders for this visit:    Medicare annual wellness visit, subsequent    Mixed hyperlipidemia    Essential hypertension    Anxiety and depression    JERNIGAN (dyspnea on  exertion)  -     XR Chest PA & Lateral  -     ECG 12 Lead  -     Treadmill Stress Test; Future    Chest pressure  -     XR Chest PA & Lateral  -     ECG 12 Lead  -     Treadmill Stress Test; Future        Discussion/Summary  Coby is here for her AWV and follow up.  She is up to date on health maintenance.  Her bp is controlled.  Her lipids are well controlled.  Anxiety and depression are controlled.  Her cxr and EKG are normal today.  Given the progressive nature of her JERNIGAN and her episodes of chest pressure, I would like for her to have a stress test.  She is agreeable to this plan.  In the meantime, continue all current meds.  She will need follow up in 6 months, if not sooner.        Follow Up:  No Follow-up on file.     An After Visit Summary and PPPS with all of these plans were given to the patient.

## 2019-04-30 RX ORDER — LOVASTATIN 40 MG/1
TABLET ORAL
Qty: 90 TABLET | Refills: 0 | Status: SHIPPED | OUTPATIENT
Start: 2019-04-30 | End: 2019-07-23 | Stop reason: SDUPTHER

## 2019-05-03 ENCOUNTER — TELEPHONE (OUTPATIENT)
Dept: INTERNAL MEDICINE | Facility: CLINIC | Age: 79
End: 2019-05-03

## 2019-05-03 DIAGNOSIS — R07.89 CHEST PRESSURE: Primary | ICD-10-CM

## 2019-05-03 DIAGNOSIS — R06.09 DOE (DYSPNEA ON EXERTION): ICD-10-CM

## 2019-05-03 DIAGNOSIS — R93.89 ABNORMAL CXR (CHEST X-RAY): ICD-10-CM

## 2019-05-03 NOTE — TELEPHONE ENCOUNTER
Patient in receipt of your message regarding scheduling a CT scan due to slight abnormal XRAY results.  Please generate a referrral CT at [facility] no preference to date and time

## 2019-05-09 ENCOUNTER — HOSPITAL ENCOUNTER (OUTPATIENT)
Dept: CARDIOLOGY | Facility: HOSPITAL | Age: 79
Discharge: HOME | End: 2019-05-09
Admitting: INTERNAL MEDICINE

## 2019-05-09 DIAGNOSIS — R06.09 DOE (DYSPNEA ON EXERTION): ICD-10-CM

## 2019-05-09 DIAGNOSIS — R07.89 CHEST PRESSURE: ICD-10-CM

## 2019-05-09 LAB
BH CV STRESS BP STAGE 1: NORMAL
BH CV STRESS BP STAGE 2: NORMAL
BH CV STRESS DURATION MIN STAGE 1: 3
BH CV STRESS DURATION MIN STAGE 2: 3
BH CV STRESS DURATION MIN STAGE 3: 0
BH CV STRESS DURATION SEC STAGE 1: 0
BH CV STRESS DURATION SEC STAGE 2: 0
BH CV STRESS DURATION SEC STAGE 3: 30
BH CV STRESS GRADE STAGE 1: 10
BH CV STRESS GRADE STAGE 2: 12
BH CV STRESS GRADE STAGE 3: 14
BH CV STRESS HR STAGE 1: 108
BH CV STRESS HR STAGE 2: 121
BH CV STRESS HR STAGE 3: 122
BH CV STRESS METS STAGE 1: 5
BH CV STRESS METS STAGE 2: 7.5
BH CV STRESS METS STAGE 3: 10
BH CV STRESS O2 STAGE 2: 93
BH CV STRESS PROTOCOL 1: NORMAL
BH CV STRESS RECOVERY BP: NORMAL MMHG
BH CV STRESS RECOVERY HR: 82 BPM
BH CV STRESS SPEED STAGE 1: 1.7
BH CV STRESS SPEED STAGE 2: 2.5
BH CV STRESS SPEED STAGE 3: 3.4
BH CV STRESS STAGE 1: 1
BH CV STRESS STAGE 2: 2
BH CV STRESS STAGE 3: 3
MAXIMAL PREDICTED HEART RATE: 143 BPM
PERCENT MAX PREDICTED HR: 85.31 %
STRESS BASELINE BP: NORMAL MMHG
STRESS BASELINE HR: 77 BPM
STRESS PERCENT HR: 100 %
STRESS POST ESTIMATED WORKLOAD: 8 METS
STRESS POST EXERCISE DUR MIN: 6 MIN
STRESS POST EXERCISE DUR SEC: 30 SEC
STRESS POST PEAK BP: NORMAL MMHG
STRESS POST PEAK HR: 122 BPM
STRESS TARGET HR: 122 BPM

## 2019-05-09 PROCEDURE — 93018 CV STRESS TEST I&R ONLY: CPT | Performed by: INTERNAL MEDICINE

## 2019-05-09 PROCEDURE — 93016 CV STRESS TEST SUPVJ ONLY: CPT | Performed by: INTERNAL MEDICINE

## 2019-05-09 PROCEDURE — 93017 CV STRESS TEST TRACING ONLY: CPT

## 2019-05-10 ENCOUNTER — APPOINTMENT (OUTPATIENT)
Dept: CT IMAGING | Facility: HOSPITAL | Age: 79
End: 2019-05-10

## 2019-05-14 ENCOUNTER — HOSPITAL ENCOUNTER (OUTPATIENT)
Dept: CT IMAGING | Facility: HOSPITAL | Age: 79
Discharge: HOME | End: 2019-05-14
Admitting: INTERNAL MEDICINE

## 2019-05-14 DIAGNOSIS — R06.09 DOE (DYSPNEA ON EXERTION): ICD-10-CM

## 2019-05-14 DIAGNOSIS — R07.89 CHEST PRESSURE: ICD-10-CM

## 2019-05-14 DIAGNOSIS — R93.89 ABNORMAL CXR (CHEST X-RAY): ICD-10-CM

## 2019-05-14 LAB — CREAT BLDA-MCNC: 0.9 MG/DL (ref 0.6–1.3)

## 2019-05-14 PROCEDURE — 25010000002 IOPAMIDOL 61 % SOLUTION: Performed by: INTERNAL MEDICINE

## 2019-05-14 PROCEDURE — 71260 CT THORAX DX C+: CPT

## 2019-05-14 PROCEDURE — 82565 ASSAY OF CREATININE: CPT

## 2019-05-14 RX ADMIN — IOPAMIDOL 75 ML: 612 INJECTION, SOLUTION INTRAVENOUS at 13:14

## 2019-05-17 ENCOUNTER — TELEPHONE (OUTPATIENT)
Dept: INTERNAL MEDICINE | Facility: CLINIC | Age: 79
End: 2019-05-17

## 2019-05-17 DIAGNOSIS — R93.89 ABNORMAL CXR (CHEST X-RAY): ICD-10-CM

## 2019-05-17 DIAGNOSIS — R07.89 CHEST PRESSURE: ICD-10-CM

## 2019-05-17 DIAGNOSIS — R06.09 DOE (DYSPNEA ON EXERTION): Primary | ICD-10-CM

## 2019-06-17 RX ORDER — BUPROPION HYDROCHLORIDE 100 MG/1
100 TABLET, EXTENDED RELEASE ORAL 2 TIMES DAILY
Qty: 60 TABLET | Refills: 3 | Status: SHIPPED | OUTPATIENT
Start: 2019-06-17

## 2019-07-16 ENCOUNTER — TRANSCRIBE ORDERS (OUTPATIENT)
Dept: ADMINISTRATIVE | Facility: HOSPITAL | Age: 79
End: 2019-07-16

## 2019-07-16 DIAGNOSIS — R91.1 PULMONARY NODULE: Primary | ICD-10-CM

## 2019-07-23 RX ORDER — FLUOXETINE HYDROCHLORIDE 20 MG/1
40 CAPSULE ORAL DAILY
Qty: 60 CAPSULE | Refills: 0 | Status: SHIPPED | OUTPATIENT
Start: 2019-07-23

## 2019-07-23 RX ORDER — LOVASTATIN 40 MG/1
40 TABLET ORAL
Qty: 30 TABLET | Refills: 0 | Status: SHIPPED | OUTPATIENT
Start: 2019-07-23 | End: 2019-08-12 | Stop reason: SDUPTHER

## 2019-08-12 RX ORDER — LOVASTATIN 40 MG/1
TABLET ORAL
Qty: 30 TABLET | Refills: 0 | Status: SHIPPED | OUTPATIENT
Start: 2019-08-12

## 2019-08-26 RX ORDER — FLUOXETINE HYDROCHLORIDE 20 MG/1
CAPSULE ORAL
Qty: 60 CAPSULE | Refills: 0 | OUTPATIENT
Start: 2019-08-26

## 2019-08-26 RX ORDER — LOVASTATIN 40 MG/1
TABLET ORAL
Qty: 30 TABLET | Refills: 0 | OUTPATIENT
Start: 2019-08-26

## 2019-09-17 RX ORDER — LOVASTATIN 40 MG/1
TABLET ORAL
Qty: 30 TABLET | Refills: 0 | OUTPATIENT
Start: 2019-09-17

## 2019-10-21 RX ORDER — BUPROPION HYDROCHLORIDE 100 MG/1
TABLET, EXTENDED RELEASE ORAL
Qty: 60 TABLET | Refills: 2 | OUTPATIENT
Start: 2019-10-21

## 2019-11-05 ENCOUNTER — TRANSCRIBE ORDERS (OUTPATIENT)
Dept: ADMINISTRATIVE | Facility: HOSPITAL | Age: 79
End: 2019-11-05

## 2019-11-05 DIAGNOSIS — Z12.31 VISIT FOR SCREENING MAMMOGRAM: Primary | ICD-10-CM

## 2019-12-19 ENCOUNTER — HOSPITAL ENCOUNTER (OUTPATIENT)
Dept: MAMMOGRAPHY | Facility: HOSPITAL | Age: 79
Discharge: HOME | End: 2019-12-19
Admitting: INTERNAL MEDICINE

## 2019-12-19 DIAGNOSIS — Z12.31 VISIT FOR SCREENING MAMMOGRAM: ICD-10-CM

## 2019-12-19 PROCEDURE — 77067 SCR MAMMO BI INCL CAD: CPT

## 2019-12-19 PROCEDURE — 77063 BREAST TOMOSYNTHESIS BI: CPT

## 2020-01-13 ENCOUNTER — APPOINTMENT (OUTPATIENT)
Dept: CT IMAGING | Facility: HOSPITAL | Age: 80
End: 2020-01-13

## 2020-01-17 ENCOUNTER — HOSPITAL ENCOUNTER (OUTPATIENT)
Dept: CT IMAGING | Facility: HOSPITAL | Age: 80
Discharge: HOME | End: 2020-01-17
Admitting: INTERNAL MEDICINE

## 2020-01-17 DIAGNOSIS — R91.1 PULMONARY NODULE: ICD-10-CM

## 2020-01-17 PROCEDURE — 71250 CT THORAX DX C-: CPT

## 2020-02-28 NOTE — TELEPHONE ENCOUNTER
Pt informed, states understanding.      Treatment Number (Will Not Render If 0): 1 Post-Care Instructions: I reviewed with the patient in detail post-care instructions. Patient is to wear sunprotection, and avoid picking at any of the treated lesions. Pt may apply Vaseline to crusted or scabbing areas. Medical Necessity Information: It is in your best interest to select a reason for this procedure from the list below. All of these items fulfill various CMS LCD requirements except the new and changing color options. Render Post-Care Instructions In Note?: yes Render Note In Bullet Format When Appropriate: No Consent: The patient's consent was obtained including but not limited to risks of crusting, scabbing, blistering, scarring, darker or lighter pigmentary change, recurrence, incomplete removal and infection. Anesthesia Volume In Cc: 0.6 Anesthesia Type: 2% lidocaine with epinephrine and a 1:12 solution of 8.4% sodium bicarbonate Medical Necessity Clause: This procedure was medically necessary because the lesions that were treated were: Detail Level: Detailed

## 2021-03-09 DIAGNOSIS — Z23 IMMUNIZATION DUE: ICD-10-CM

## 2023-03-23 ENCOUNTER — CLINICAL SUPPORT (OUTPATIENT)
Dept: GENETICS | Facility: HOSPITAL | Age: 83
End: 2023-03-23

## 2023-03-23 DIAGNOSIS — Z13.79 GENETIC TESTING: Primary | ICD-10-CM

## 2023-03-23 DIAGNOSIS — Z80.0 FAMILY HISTORY OF PANCREATIC CANCER: ICD-10-CM

## 2023-03-23 DIAGNOSIS — Z80.3 FAMILY HISTORY OF BREAST CANCER: ICD-10-CM

## 2023-03-23 PROCEDURE — 96040: CPT | Performed by: GENETIC COUNSELOR, MS

## 2023-03-23 NOTE — PROGRESS NOTES
Coby Roland is an 82-year old female who was seen for genetic counseling due to a family history of cancer and a mosaic TP53 mutation identified in her daughter. She does not have a personal history of cancer. She was 13 years old at menarche and had her first child at age 25. She is post-menopausal and had a partial hysterectomy at 32. She retains her ovaries. Ms. Roland’s most recent mammogram was in March 2022 and was normal. She has had one colonoscopy, and she reports no history of polyps. Ms. Roland was interested in discussing her risk for a hereditary cancer syndrome and decided to pursue genetic testing. She opted to pursue testing for the known familial TP53 mutation through [facility]. Single site testing for the known mutation is recommended for unaffected individuals in a family once a mutation has been identified. Familial testing was ordered through [facility]’s familial testing program. Results are expected back in 2-3 weeks.     PERTINENT FAMILY HISTORY: (See attached pedigree)   Daughter 1:  Breast cancer, 55     Mosaic PT53+  Daughter 2:  Breast cancer, 38  Son:   Basal cell carcinoma     TP53 neg  Brother 1:  Brain cancer, 70s  Brother 2:  Prostate cancer  Brother 3:  Pancreatic cancer  Niece:   Leukemia  Nephew:  Leukemia    A copy of her daughter’s genetic testing confirming the TP53 (c.[id number]) mutation was available for review.    RISK ASSESSMENT:  Ms. Roland’s family history of cancer raises the question of a hereditary cancer syndrome. Her daughter has been identified to have a mosaic TP53 mutation. It is not known at this point in time the cause of her daughter’s mosaicism. If this mutation is indeed a germline mutation, there is a chance that Ms. Roland has the same mutation. We discussed full TP53 testing as well as multigene panel testing based on additional family history. This risk assessment is based on the family history information provided at the time of the appointment. The  assessment could change in the future should new information be obtained.    GENETIC COUNSELING: (35 minutes) We reviewed the family history information in detail. Cases of cancer follow three general patterns: sporadic, familial, and hereditary. While most cancer is sporadic, some cases appear to occur in family clusters. These cases are said to be familial and account for 10-20% of breast cancer cases. Familial cases may be due to a combination of shared genes and environmental factors among family members. In even fewer cases, the risk for cancer is inherited, and the genes responsible for the increased cancer risk are known.      Family histories typical of hereditary cancer syndromes usually include multiple first- and second-degree relatives diagnosed with cancer types that define a syndrome. These cases tend to be diagnosed at younger-than-expected ages and can be bilateral or multifocal. The cancer in these families follows an autosomal dominant inheritance pattern, which indicates the likely presence of a mutation in a cancer susceptibility gene. Children and siblings of an individual believed to carry this mutation have a 50% chance of inheriting that mutation, thereby inheriting the increased risk to develop cancer. These mutations can be passed down from the maternal or the paternal lineage.    Based on Ms. Roland’s family history of cancer and the identified mosaic TP53 mutation in her daughter, we discussed Li Fraumeni syndrome (LFS). Classic LFS is associated with high lifetime risks of cancer, with the risk of cancer estimated at 50% by age 30 years and 90% by age 60. LFS-related cancers can occur in childhood or young adulthood, and individuals who have had one diagnosis have an increased risk for multiple primary cancers. LFS is characterized by development of the hallmark cancers including; soft tissue sarcoma, osteosarcoma, pre-menopausal breast cancer, brain tumors, adrenocortical carcinoma  (ACC), and leukemias. In addition, a variety of other neoplasms may occur.       Literature has shown that some individuals who appear to have a germline TP53 mutation may have an alternative explanation for the genetic finding due to clonal hematopoiesis. In these cases, the finding is a result of a somatic (acquired) mutation rather than a germline mutation; this can be related to advanced age, prior chemotherapy, hematologic malignancy/pre-malignancy, or circulating tumor cells. Therefore, further investigation is typically recommended in order to determine if this is more likely to be a germline finding or a somatic mutation. In some cases, this can be very difficult to clarify. When possible, parental studies and testing other close relatives is the best next step. This is another reason it would be appropriate to test Ms. Roland. She opted to move forward with testing for this change.    Genetic Testing:  The risks, benefits and limitations of genetic testing and implications for clinical management following testing were reviewed. DNA test results can influence decisions regarding screening, prevention and surgical management. Genetic testing can have significant psychological implications for both individuals and families. Also discussed was the possibility of employment and insurance discrimination based on genetic test results and the laws in place to prevent this, and the limitations of those laws. The benefits and limitations of genetic testing were discussed, and Ms. Roland decided to pursue testing via the familial testing program with [facility].      PLAN:  Familial testing was ordered through [facility]’s familial testing program. Results are expected in 2-3 weeks and Ms. Roland will be contacted at that time. She is welcome to call us with any questions in the meantime at [phone].       Anupama Capone MS, [facility], [facility]         Licensed Certified Genetic Counselor

## 2023-04-05 ENCOUNTER — DOCUMENTATION (OUTPATIENT)
Dept: GENETICS | Facility: HOSPITAL | Age: 83
End: 2023-04-05
Payer: MEDICARE

## 2023-04-05 NOTE — PROGRESS NOTES
Coby Roland is an 82-year old female who was seen for genetic counseling due to a family history of cancer and a mosaic TP53 mutation identified in her daughter. She does not have a personal history of cancer. She was 13 years old at menarche and had her first child at age 25. She is post-menopausal and had a partial hysterectomy at 32. She retains her ovaries. Ms. Roland’s most recent mammogram was in March 2022 and was normal. She has had one colonoscopy, and she reports no history of polyps. Ms. Roland was interested in discussing her risk for a hereditary cancer syndrome and decided to pursue genetic testing. She opted to pursue testing for the known familial TP53 mutation through [facility]. Single site testing for the known mutation is recommended for unaffected individuals in a family once a mutation has been identified. Familial testing was ordered through [facility]’s familial testing program. Genetic testing was negative for the known familial mutation in the TP53 gene.  These normal results were discussed with Ms. Roland by telephone on 4/5/2023.    PERTINENT FAMILY HISTORY: (See attached pedigree)   Daughter 1:  Breast cancer, 55     Mosaic PT53+  Daughter 2:  Breast cancer, 38  Son:   Basal cell carcinoma     TP53 neg  Brother 1:  Brain cancer, 70s  Brother 2:  Prostate cancer  Brother 3:  Pancreatic cancer  Niece:   Leukemia  Nephew:  Leukemia    A copy of her daughter’s genetic testing confirming the TP53 (c.818G>A) mutation was available for review.    RISK ASSESSMENT:  Ms. Roland’s family history of cancer raises the question of a hereditary cancer syndrome. Her daughter has been identified to have a mosaic TP53 mutation. It is not known at this point in time the cause of her daughter’s mosaicism. If this mutation is indeed a germline mutation, there is a chance that Ms. Roland has the same mutation. We discussed full TP53 testing as well as multigene panel testing based on additional family history.  This risk assessment is based on the family history information provided at the time of the appointment. The assessment could change in the future should new information be obtained.    At this time, Ms. Roland’s management should be guided by a family history-based risk assessment. Tyrer-Cuzick, version 8 is able to take into account personal factors (age at menarche, age at first live birth, breast density, etc) and family history when calculating risk for breast cancer. Computer modeling estimates that Ms. Roland’s lifetime personal risk for developing breast cancer is up to 1.5% (Ziyad-Marcoszick, v8), compared to the average 81 year old female’s risk of 1.4%. In general, a lifetime risk above 20% is considered to be “high risk” where increased screening is warranted; Ms. Jerry risk does not fall into that category. This risk assessment is based on the family history information provided at the time of the appointment and could change in the future should new information be obtained.    GENETIC COUNSELING: (35 minutes) We reviewed the family history information in detail. Cases of cancer follow three general patterns: sporadic, familial, and hereditary. While most cancer is sporadic, some cases appear to occur in family clusters. These cases are said to be familial and account for 10-20% of breast cancer cases. Familial cases may be due to a combination of shared genes and environmental factors among family members. In even fewer cases, the risk for cancer is inherited, and the genes responsible for the increased cancer risk are known.      Family histories typical of hereditary cancer syndromes usually include multiple first- and second-degree relatives diagnosed with cancer types that define a syndrome. These cases tend to be diagnosed at younger-than-expected ages and can be bilateral or multifocal. The cancer in these families follows an autosomal dominant inheritance pattern, which indicates the likely  presence of a mutation in a cancer susceptibility gene. Children and siblings of an individual believed to carry this mutation have a 50% chance of inheriting that mutation, thereby inheriting the increased risk to develop cancer. These mutations can be passed down from the maternal or the paternal lineage.    Based on Ms. Roland’s family history of cancer and the identified mosaic TP53 mutation in her daughter, we discussed Li Fraumeni syndrome (LFS). Classic LFS is associated with high lifetime risks of cancer, with the risk of cancer estimated at 50% by age 30 years and 90% by age 60. LFS-related cancers can occur in childhood or young adulthood, and individuals who have had one diagnosis have an increased risk for multiple primary cancers. LFS is characterized by development of the hallmark cancers including; soft tissue sarcoma, osteosarcoma, pre-menopausal breast cancer, brain tumors, adrenocortical carcinoma (ACC), and leukemias. In addition, a variety of other neoplasms may occur.       Literature has shown that some individuals who appear to have a germline TP53 mutation may have an alternative explanation for the genetic finding due to clonal hematopoiesis. In these cases, the finding is a result of a somatic (acquired) mutation rather than a germline mutation; this can be related to advanced age, prior chemotherapy, hematologic malignancy/pre-malignancy, or circulating tumor cells. Therefore, further investigation is typically recommended in order to determine if this is more likely to be a germline finding or a somatic mutation. In some cases, this can be very difficult to clarify. When possible, parental studies and testing other close relatives is the best next step. This is another reason it would be appropriate to test Ms. Roland. She opted to move forward with testing for this change.    GENETIC TESTING:  The risks, benefits and limitations of genetic testing and implications for clinical management  following testing were reviewed. DNA test results can influence decisions regarding screening, prevention and surgical management. Genetic testing can have significant psychological implications for both individuals and families. Also discussed was the possibility of employment and insurance discrimination based on genetic test results and the laws in place to prevent this, and the limitations of those laws. The benefits and limitations of genetic testing were discussed, and Ms. Roland decided to pursue testing via the familial testing program with [facility] [facility].    TEST RESULTS: Genetic testing was negative for the known familial mutation in the TP53 gene. This result indicates that Ms. Roland did not inherit the TP53 mutation her daughter has, and reduces the likelihood of a hereditary cancer syndrome.      CANCER PREVENTION:  Options available to individuals with an elevated lifetime risk for breast and/or ovarian cancer were briefly discussed. Based on computer modeling, Ms. Roland’s lifetime risk for breast cancer would not be considered “high risk” (>20%). Per NCCN guidelines, it is appropriate for her to follow general population screening guidelines for her breast cancer risk including annual clinical breast exam and annual mammography. These assessments are based on the information provided at the time of consultation.    PLAN:  Genetic counseling remains available to Ms. Roland. She is welcome to call us with any questions in the future at [phone].       Anupama Capone MS, [place], C         Licensed Certified Genetic Counselor    Cc: NATHALIA Martinez

## 2024-06-20 ENCOUNTER — APPOINTMENT (OUTPATIENT)
Dept: ULTRASOUND IMAGING | Facility: HOSPITAL | Age: 84
End: 2024-06-20
Payer: MEDICARE

## 2024-06-20 ENCOUNTER — HOSPITAL ENCOUNTER (OUTPATIENT)
Facility: HOSPITAL | Age: 84
Setting detail: OBSERVATION
Discharge: HOME | End: 2024-06-21
Attending: EMERGENCY MEDICINE | Admitting: EMERGENCY MEDICINE
Payer: MEDICARE

## 2024-06-20 ENCOUNTER — APPOINTMENT (OUTPATIENT)
Dept: GENERAL RADIOLOGY | Facility: HOSPITAL | Age: 84
End: 2024-06-20
Payer: MEDICARE

## 2024-06-20 DIAGNOSIS — R79.89 TROPONIN LEVEL ELEVATED: Primary | ICD-10-CM

## 2024-06-20 DIAGNOSIS — M79.602 PAIN OF LEFT UPPER EXTREMITY: ICD-10-CM

## 2024-06-20 PROBLEM — R07.9 CHEST PAIN: Status: ACTIVE | Noted: 2024-06-20

## 2024-06-20 LAB
ALBUMIN SERPL-MCNC: 3.9 G/DL (ref 3.5–5.2)
ALBUMIN/GLOB SERPL: 2 G/DL
ALP SERPL-CCNC: 75 U/L (ref 39–117)
ALT SERPL W P-5'-P-CCNC: 12 U/L (ref 1–33)
ANION GAP SERPL CALCULATED.3IONS-SCNC: 7.9 MMOL/L (ref 5–15)
AST SERPL-CCNC: 18 U/L (ref 1–32)
BASOPHILS # BLD AUTO: 0.03 10*3/MM3 (ref 0–0.2)
BASOPHILS NFR BLD AUTO: 0.5 % (ref 0–1.5)
BILIRUB SERPL-MCNC: 0.2 MG/DL (ref 0–1.2)
BUN SERPL-MCNC: 21 MG/DL (ref 8–23)
BUN/CREAT SERPL: 27.3 (ref 7–25)
CALCIUM SPEC-SCNC: 9.6 MG/DL (ref 8.6–10.5)
CHLORIDE SERPL-SCNC: 103 MMOL/L (ref 98–107)
CO2 SERPL-SCNC: 26.1 MMOL/L (ref 22–29)
CREAT SERPL-MCNC: 0.77 MG/DL (ref 0.57–1)
DEPRECATED RDW RBC AUTO: 45.1 FL (ref 37–54)
EGFRCR SERPLBLD CKD-EPI 2021: 77.1 ML/MIN/1.73
EOSINOPHIL # BLD AUTO: 0.39 10*3/MM3 (ref 0–0.4)
EOSINOPHIL NFR BLD AUTO: 6.9 % (ref 0.3–6.2)
ERYTHROCYTE [DISTWIDTH] IN BLOOD BY AUTOMATED COUNT: 13 % (ref 12.3–15.4)
GLOBULIN UR ELPH-MCNC: 2 GM/DL
GLUCOSE SERPL-MCNC: 101 MG/DL (ref 65–99)
HCT VFR BLD AUTO: 37.4 % (ref 34–46.6)
HGB BLD-MCNC: 12.1 G/DL (ref 12–15.9)
IMM GRANULOCYTES # BLD AUTO: 0.01 10*3/MM3 (ref 0–0.05)
IMM GRANULOCYTES NFR BLD AUTO: 0.2 % (ref 0–0.5)
LYMPHOCYTES # BLD AUTO: 1.52 10*3/MM3 (ref 0.7–3.1)
LYMPHOCYTES NFR BLD AUTO: 27 % (ref 19.6–45.3)
MCH RBC QN AUTO: 30 PG (ref 26.6–33)
MCHC RBC AUTO-ENTMCNC: 32.4 G/DL (ref 31.5–35.7)
MCV RBC AUTO: 92.8 FL (ref 79–97)
MONOCYTES # BLD AUTO: 0.67 10*3/MM3 (ref 0.1–0.9)
MONOCYTES NFR BLD AUTO: 11.9 % (ref 5–12)
NEUTROPHILS NFR BLD AUTO: 3 10*3/MM3 (ref 1.7–7)
NEUTROPHILS NFR BLD AUTO: 53.5 % (ref 42.7–76)
PLATELET # BLD AUTO: 235 10*3/MM3 (ref 140–450)
PMV BLD AUTO: 9.5 FL (ref 6–12)
POTASSIUM SERPL-SCNC: 4.3 MMOL/L (ref 3.5–5.2)
PROT SERPL-MCNC: 5.9 G/DL (ref 6–8.5)
RBC # BLD AUTO: 4.03 10*6/MM3 (ref 3.77–5.28)
SODIUM SERPL-SCNC: 137 MMOL/L (ref 136–145)
TROPONIN T SERPL HS-MCNC: 14 NG/L
TROPONIN T SERPL HS-MCNC: 15 NG/L
WBC NRBC COR # BLD AUTO: 5.62 10*3/MM3 (ref 3.4–10.8)

## 2024-06-20 PROCEDURE — 93005 ELECTROCARDIOGRAM TRACING: CPT | Performed by: PHYSICIAN ASSISTANT

## 2024-06-20 PROCEDURE — 93005 ELECTROCARDIOGRAM TRACING: CPT | Performed by: EMERGENCY MEDICINE

## 2024-06-20 PROCEDURE — 93971 EXTREMITY STUDY: CPT

## 2024-06-20 PROCEDURE — 36415 COLL VENOUS BLD VENIPUNCTURE: CPT

## 2024-06-20 PROCEDURE — 93010 ELECTROCARDIOGRAM REPORT: CPT | Performed by: INTERNAL MEDICINE

## 2024-06-20 PROCEDURE — 99284 EMERGENCY DEPT VISIT MOD MDM: CPT | Performed by: PHYSICIAN ASSISTANT

## 2024-06-20 PROCEDURE — 71045 X-RAY EXAM CHEST 1 VIEW: CPT

## 2024-06-20 PROCEDURE — 85025 COMPLETE CBC W/AUTO DIFF WBC: CPT | Performed by: PHYSICIAN ASSISTANT

## 2024-06-20 PROCEDURE — 84484 ASSAY OF TROPONIN QUANT: CPT | Performed by: PHYSICIAN ASSISTANT

## 2024-06-20 PROCEDURE — 99285 EMERGENCY DEPT VISIT HI MDM: CPT

## 2024-06-20 PROCEDURE — 80053 COMPREHEN METABOLIC PANEL: CPT | Performed by: PHYSICIAN ASSISTANT

## 2024-06-20 RX ORDER — ASPIRIN 81 MG/1
324 TABLET, CHEWABLE ORAL ONCE
Status: COMPLETED | OUTPATIENT
Start: 2024-06-20 | End: 2024-06-20

## 2024-06-20 RX ADMIN — ASPIRIN 324 MG: 81 TABLET, CHEWABLE ORAL at 19:57

## 2024-06-21 VITALS
WEIGHT: 135.36 LBS | HEART RATE: 74 BPM | RESPIRATION RATE: 18 BRPM | DIASTOLIC BLOOD PRESSURE: 98 MMHG | OXYGEN SATURATION: 95 % | SYSTOLIC BLOOD PRESSURE: 161 MMHG | TEMPERATURE: 98.3 F | BODY MASS INDEX: 26.58 KG/M2 | HEIGHT: 60 IN

## 2024-06-21 PROBLEM — R79.89 TROPONIN LEVEL ELEVATED: Status: RESOLVED | Noted: 2024-06-20 | Resolved: 2024-06-21

## 2024-06-21 PROBLEM — R07.9 CHEST PAIN: Status: RESOLVED | Noted: 2024-06-20 | Resolved: 2024-06-21

## 2024-06-21 LAB
ANION GAP SERPL CALCULATED.3IONS-SCNC: 8.7 MMOL/L (ref 5–15)
BUN SERPL-MCNC: 15 MG/DL (ref 8–23)
BUN/CREAT SERPL: 21.4 (ref 7–25)
CALCIUM SPEC-SCNC: 8.9 MG/DL (ref 8.6–10.5)
CHLORIDE SERPL-SCNC: 107 MMOL/L (ref 98–107)
CO2 SERPL-SCNC: 25.3 MMOL/L (ref 22–29)
CREAT SERPL-MCNC: 0.7 MG/DL (ref 0.57–1)
DEPRECATED RDW RBC AUTO: 40.4 FL (ref 37–54)
EGFRCR SERPLBLD CKD-EPI 2021: 86.5 ML/MIN/1.73
ERYTHROCYTE [DISTWIDTH] IN BLOOD BY AUTOMATED COUNT: 12 % (ref 12.3–15.4)
GEN 5 2HR TROPONIN T REFLEX: 12 NG/L
GLUCOSE SERPL-MCNC: 98 MG/DL (ref 65–99)
HCT VFR BLD AUTO: 37.5 % (ref 34–46.6)
HGB BLD-MCNC: 12.3 G/DL (ref 12–15.9)
MCH RBC QN AUTO: 29.9 PG (ref 26.6–33)
MCHC RBC AUTO-ENTMCNC: 32.8 G/DL (ref 31.5–35.7)
MCV RBC AUTO: 91.2 FL (ref 79–97)
PLATELET # BLD AUTO: 252 10*3/MM3 (ref 140–450)
PMV BLD AUTO: 9.8 FL (ref 6–12)
POTASSIUM SERPL-SCNC: 3.7 MMOL/L (ref 3.5–5.2)
QT INTERVAL: 392 MS
QT INTERVAL: 410 MS
QT INTERVAL: 421 MS
QTC INTERVAL: 448 MS
QTC INTERVAL: 465 MS
QTC INTERVAL: 472 MS
RBC # BLD AUTO: 4.11 10*6/MM3 (ref 3.77–5.28)
SODIUM SERPL-SCNC: 141 MMOL/L (ref 136–145)
TROPONIN T DELTA: -2 NG/L
TROPONIN T SERPL HS-MCNC: 14 NG/L
WBC NRBC COR # BLD AUTO: 5.97 10*3/MM3 (ref 3.4–10.8)

## 2024-06-21 PROCEDURE — G0378 HOSPITAL OBSERVATION PER HR: HCPCS

## 2024-06-21 PROCEDURE — 93005 ELECTROCARDIOGRAM TRACING: CPT

## 2024-06-21 PROCEDURE — 85027 COMPLETE CBC AUTOMATED: CPT

## 2024-06-21 PROCEDURE — 99204 OFFICE O/P NEW MOD 45 MIN: CPT | Performed by: INTERNAL MEDICINE

## 2024-06-21 PROCEDURE — 80048 BASIC METABOLIC PNL TOTAL CA: CPT

## 2024-06-21 PROCEDURE — 84484 ASSAY OF TROPONIN QUANT: CPT

## 2024-06-21 PROCEDURE — 93010 ELECTROCARDIOGRAM REPORT: CPT | Performed by: INTERNAL MEDICINE

## 2024-06-21 RX ORDER — ONDANSETRON 2 MG/ML
4 INJECTION INTRAMUSCULAR; INTRAVENOUS EVERY 6 HOURS PRN
Status: DISCONTINUED | OUTPATIENT
Start: 2024-06-21 | End: 2024-06-21 | Stop reason: HOSPADM

## 2024-06-21 RX ORDER — BUPROPION HYDROCHLORIDE 100 MG/1
100 TABLET, EXTENDED RELEASE ORAL 2 TIMES DAILY
Status: DISCONTINUED | OUTPATIENT
Start: 2024-06-21 | End: 2024-06-21 | Stop reason: HOSPADM

## 2024-06-21 RX ORDER — VENLAFAXINE HYDROCHLORIDE 150 MG/1
150 CAPSULE, EXTENDED RELEASE ORAL DAILY
COMMUNITY
Start: 2024-05-23 | End: 2025-05-23

## 2024-06-21 RX ORDER — BISACODYL 5 MG/1
5 TABLET, DELAYED RELEASE ORAL DAILY PRN
Status: DISCONTINUED | OUTPATIENT
Start: 2024-06-21 | End: 2024-06-21 | Stop reason: HOSPADM

## 2024-06-21 RX ORDER — POLYETHYLENE GLYCOL 3350 17 G/17G
17 POWDER, FOR SOLUTION ORAL DAILY PRN
Status: DISCONTINUED | OUTPATIENT
Start: 2024-06-21 | End: 2024-06-21 | Stop reason: HOSPADM

## 2024-06-21 RX ORDER — LISINOPRIL 10 MG/1
10 TABLET ORAL DAILY
Status: DISCONTINUED | OUTPATIENT
Start: 2024-06-21 | End: 2024-06-21 | Stop reason: HOSPADM

## 2024-06-21 RX ORDER — UREA 10 %
5 LOTION (ML) TOPICAL NIGHTLY PRN
Status: DISCONTINUED | OUTPATIENT
Start: 2024-06-21 | End: 2024-06-21 | Stop reason: HOSPADM

## 2024-06-21 RX ORDER — LISINOPRIL 10 MG/1
10 TABLET ORAL DAILY
COMMUNITY
Start: 2024-05-23

## 2024-06-21 RX ORDER — AMOXICILLIN 250 MG
2 CAPSULE ORAL 2 TIMES DAILY PRN
Status: DISCONTINUED | OUTPATIENT
Start: 2024-06-21 | End: 2024-06-21 | Stop reason: HOSPADM

## 2024-06-21 RX ORDER — SODIUM CHLORIDE 9 MG/ML
40 INJECTION, SOLUTION INTRAVENOUS AS NEEDED
Status: DISCONTINUED | OUTPATIENT
Start: 2024-06-21 | End: 2024-06-21 | Stop reason: HOSPADM

## 2024-06-21 RX ORDER — SODIUM CHLORIDE 0.9 % (FLUSH) 0.9 %
10 SYRINGE (ML) INJECTION AS NEEDED
Status: DISCONTINUED | OUTPATIENT
Start: 2024-06-21 | End: 2024-06-21 | Stop reason: HOSPADM

## 2024-06-21 RX ORDER — SODIUM CHLORIDE 0.9 % (FLUSH) 0.9 %
10 SYRINGE (ML) INJECTION EVERY 12 HOURS SCHEDULED
Status: DISCONTINUED | OUTPATIENT
Start: 2024-06-21 | End: 2024-06-21 | Stop reason: HOSPADM

## 2024-06-21 RX ORDER — ACETAMINOPHEN 325 MG/1
650 TABLET ORAL EVERY 4 HOURS PRN
Status: DISCONTINUED | OUTPATIENT
Start: 2024-06-21 | End: 2024-06-21 | Stop reason: HOSPADM

## 2024-06-21 RX ORDER — NITROGLYCERIN 0.4 MG/1
0.4 TABLET SUBLINGUAL
Status: DISCONTINUED | OUTPATIENT
Start: 2024-06-21 | End: 2024-06-21 | Stop reason: HOSPADM

## 2024-06-21 RX ORDER — ACETAMINOPHEN 160 MG/5ML
650 SOLUTION ORAL EVERY 4 HOURS PRN
Status: DISCONTINUED | OUTPATIENT
Start: 2024-06-21 | End: 2024-06-21 | Stop reason: HOSPADM

## 2024-06-21 RX ORDER — VENLAFAXINE HYDROCHLORIDE 150 MG/1
150 CAPSULE, EXTENDED RELEASE ORAL DAILY
Status: DISCONTINUED | OUTPATIENT
Start: 2024-06-21 | End: 2024-06-21 | Stop reason: HOSPADM

## 2024-06-21 RX ORDER — BISACODYL 10 MG
10 SUPPOSITORY, RECTAL RECTAL DAILY PRN
Status: DISCONTINUED | OUTPATIENT
Start: 2024-06-21 | End: 2024-06-21 | Stop reason: HOSPADM

## 2024-06-21 RX ORDER — ONDANSETRON 4 MG/1
4 TABLET, ORALLY DISINTEGRATING ORAL EVERY 6 HOURS PRN
Status: DISCONTINUED | OUTPATIENT
Start: 2024-06-21 | End: 2024-06-21 | Stop reason: HOSPADM

## 2024-06-21 RX ORDER — ACETAMINOPHEN 650 MG/1
650 SUPPOSITORY RECTAL EVERY 4 HOURS PRN
Status: DISCONTINUED | OUTPATIENT
Start: 2024-06-21 | End: 2024-06-21 | Stop reason: HOSPADM

## 2024-06-21 RX ADMIN — VENLAFAXINE HYDROCHLORIDE 150 MG: 150 CAPSULE, EXTENDED RELEASE ORAL at 08:40

## 2024-06-21 RX ADMIN — Medication 10 ML: at 08:41

## 2024-06-21 RX ADMIN — Medication 10 ML: at 02:25

## 2024-06-21 RX ADMIN — LISINOPRIL 10 MG: 10 TABLET ORAL at 08:39

## 2024-06-21 NOTE — PLAN OF CARE
"Goal Outcome Evaluation:              Outcome Evaluation: Patient is a 83 year old female admitted from [facility] this shift for c/o of L arm pain. Pt has hx of L shoulder/arm pain, but she states this feels different and the pain is worse. EKG, CXR, dopplar all performed at [facility] are negative. On arrival pt denies pain or any complaints. Pt has scattered brusing, denies fall, states \"I bruise easily\". Pt NPO, no new issues noted. See v/s and labs. This RN wore appropriate PPE during care.                               "

## 2024-06-21 NOTE — CONSULTS
[facility] Consult    Patient Name: Coby Roland  Age/Sex: 83 y.o. female  : 1940  MRN: [id number]    Date of Admission: 2024  Date of Encounter Visit: 24  Encounter Provider: Zita Quintero MD  Referring Provider: NATHALIA Whelan  Place of Service: [facility] CARDIOLOGY  Patient Care Team:  Jaimie Chacon APRN as PCP - General (Nurse Practitioner)  Jaimie Chacon APRN (Nurse Practitioner)    Subjective:     Consulted for: Left arm pain    Chief Complaint: Left arm pain    History of Present Illness:  Coby Roland is a 83 y.o. female with hypertension, hyperlipidemia, who presented to the emergency room with complaints of left arm pain.    The patient reports that she had left arm pain while she was sitting at her dining room table.  The pain was concentrated in her left her upper arm and felt like a pressure-like discomfort.  Symptoms lasted in total about 2 hours.  She denies having any prior similar symptoms.  The symptoms resolved sometime after her arrival to the emergency room.  Her workup in the emergency room was unremarkable including unremarkable troponins and normal EKGs.    The patient reports that she was hospitalized in 2024 at [facility] after presenting with chest pain that occurred after she was using a tiller in her yard.  Her workup including troponins and EKG were unremarkable.  She ended up undergoing both a stress test and an echocardiogram.  Her stress test showed no evidence of ischemia.  Her echocardiogram showed normal left ventricular function and wall motion with an EF of 61% and no significant valvular disease.      The patient reports that she has been feeling well between that admission and this presentation.  She does report some dyspnea on exertion when she walks up inclines or stairs but this is stable over the last 2 years.  She denies any other symptoms of palpitations, diaphoresis,  lightheadedness, near-syncope or syncope or lower extremity swelling.      Past Medical History:  Past Medical History:   Diagnosis Date    Allergic     Penicillin     Arthritis     Cataract     Depression     Hot flashes     with recent cessation of estrogen therapy    Hyperlipidemia     Hyperlipidemia     Hypertension     Migraine headache        Past Surgical History:   Procedure Laterality Date    COLONOSCOPY  2010    Dr. Michaud    COLONOSCOPY N/A 12/13/2016    Procedure: COLONOSCOPY to cecum and TI with cold polypectomy;  Surgeon: Mary Mac MD;  Location: [facility] ENDOSCOPY;  Service:     DILATION AND CURETTAGE, DIAGNOSTIC / THERAPEUTIC      HYSTERECTOMY      OTHER SURGICAL HISTORY      cataract surgery    TONSILLECTOMY         Home Medications:   Medications Prior to Admission   Medication Sig Dispense Refill Last Dose    lisinopril (PRINIVIL,ZESTRIL) 10 MG tablet Take 1 tablet by mouth Daily.   6/20/2024    venlafaxine XR (EFFEXOR-XR) 150 MG 24 hr capsule Take 1 capsule by mouth Daily.   6/20/2024    buPROPion SR (WELLBUTRIN SR) 100 MG 12 hr tablet Take 1 tablet by mouth 2 (Two) Times a Day. 60 tablet 3     FLUoxetine (PROzac) 20 MG capsule Take 2 capsules by mouth Daily. Last refill. Patient needs to find a new PCP. 60 capsule 0     lovastatin (MEVACOR) 40 MG tablet TAKE ONE TABLET BY MOUTH EVERY NIGHT AT BEDTIME **NEED TO FIND NEW PCP; LAST REFILL 30 tablet 0     melatonin tablet Take by mouth. Take as directed       SUMAtriptan (IMITREX) 100 MG tablet TAKE ONE TABLET BY MOUTH AT ONSET OF HEADACHE; MAY REPEAT ONE TABLET IN 2 HOURS AS NEEDED. 9 tablet 2        Allergies:  Allergies   Allergen Reactions    Penicillins Swelling       Past Social History:  Social History     Socioeconomic History    Marital status:    Tobacco Use    Smoking status: Never    Smokeless tobacco: Never   Vaping Use    Vaping status: Never Used   Substance and Sexual Activity    Alcohol use: No    Drug use: No    Sexual  activity: Defer       Past Family History:  Family History   Problem Relation Age of Onset    Coronary artery disease Mother     Other Father         cerebrovascular accident (cva)    Coronary artery disease Father     Coronary artery disease Brother         1 brother    Diabetes Brother         type 2 - 2 brothers    Prostate cancer Brother         2 brothers    Crohn's disease Brother         1 brother    Breast cancer Daughter        Review of Systems:   All systems reviewed. Pertinent positives identified in HPI. All other systems are negative.    Objective:   Temp:  [97.9 °F (36.6 °C)-98.6 °F (37 °C)] 98.3 °F (36.8 °C)  Heart Rate:  [72-84] 72  Resp:  [16-18] 18  BP: (153-169)/(68-86) 159/79   No intake or output data in the 24 hours ending 06/21/24 0734  Body mass index is 26.44 kg/m².      06/20/24  1930 06/21/24  0028   Weight: 56.7 kg (125 lb) 61.4 kg (135 lb 5.8 oz)     Weight change:     Physical Exam:   General Appearance:    Alert, cooperative, in no acute distress   Head:    Normocephalic, without obvious abnormality, atraumatic   Eyes:            Lids and lashes normal, conjunctivae and sclerae normal, no   icterus, no pallor, corneas clear, PERRLA   Ears:    Ears appear intact with no abnormalities noted   Neck:   No adenopathy, supple, trachea midline, no thyromegaly, no   carotid bruit, no JVD   Lungs:     Clear to auscultation,respirations regular, even and unlabored    Heart:    Regular rhythm and normal rate, normal S1 and S2, no murmur, no gallop, no rub, no click   Chest Wall:    No abnormalities observed   Abdomen:     Normal bowel sounds, no masses, no organomegaly, soft        non-tender, non-distended, no guarding, no rebound  tenderness   Extremities:   Moves all extremities well, no edema, no cyanosis, no redness   Pulses:   Pulses palpable and equal bilaterally. Normal radial, carotid, femoral, dorsalis pedis and posterior tibial pulses bilaterally. Normal abdominal aorta    Skin:  Psychiatric:   No bleeding, bruising or rash    Alert and oriented x 3, normal mood and affect       Lab Review:   Results from last 7 days   Lab Units 06/21/24  0354 06/20/24 2002   SODIUM mmol/L 141 137   POTASSIUM mmol/L 3.7 4.3   CHLORIDE mmol/L 107 103   CO2 mmol/L 25.3 26.1   BUN mg/dL 15 21   CREATININE mg/dL 0.70 0.77   GLUCOSE mg/dL 98 101*   CALCIUM mg/dL 8.9 9.6   AST (SGOT) U/L  --  18   ALT (SGPT) U/L  --  12     Results from last 7 days   Lab Units 06/21/24  0601 06/21/24  0354 06/20/24  2214 06/20/24 2002   HSTROP T ng/L 12 14* 15* 14*     Results from last 7 days   Lab Units 06/21/24  0354 06/20/24 2002   WBC 10*3/mm3 5.97 5.62   HEMOGLOBIN g/dL 12.3 12.1   HEMATOCRIT % 37.5 37.4   PLATELETS 10*3/mm3 252 235         Imaging:  Imaging Results (Most Recent)       Procedure Component Value Units Date/Time    US Venous Doppler Upper Extremity Left (duplex) [[id number]] Collected: 06/21/24 0043     Updated: 06/21/24 0043    Narrative:        Patient: GLORIA YEE  Time Out: 00:42  Exam(s): US VENOUS BILATERAL UPPER EXTREMITIES, US VENOUS LEFT UPPER   EXTREMITY     EXAM:    US Duplex Left Upper Extremity Veins    CLINICAL HISTORY:     Reason for exam: LUE pain.    TECHNIQUE:    Real-time duplex ultrasound scan of the left upper extremity veins   integrating B-mode two-dimensional vascular structure, Doppler spectral   analysis, color flow Doppler imaging and compression.    COMPARISON:    No relevant prior studies available.    FINDINGS:    Deep veins:  Unremarkable.  No DVT in the internal jugular, subclavian,   axillary, or brachial veins.  The veins demonstrate normal color flow,   are normally compressible, with normal phasic flow and or augmentation   response.    Superficial veins:  Unremarkable.  No thrombus in the visualized   basilic and cephalic veins.    Soft tissues:  No acute findings.    IMPRESSION:         No evidence of deep venous thrombosis.    Impression:           Electronically signed by Darci Allen MD on 06-21-24 at 0042    XR Chest 1 View [[id number]] Collected: 06/20/24 2035     Updated: 06/20/24 2039    Narrative:      XR CHEST 1 VW-     Clinical: Left arm pain     COMPARISON examination 12/22/2014     FINDINGS: Cardiac size within normal limits. No mediastinal or hilar  abnormality. There is again demonstrated eventration of the left  hemidiaphragm. No pleural effusion, pulmonary edema or acute airspace  disease has developed.     CONCLUSION: No acute cardiovascular nor pulmonary process is  demonstrated.     This report was finalized on 6/20/2024 8:36 PM by Dr. Jeffrey Helms M.D  on Workstation: [id number]               I personally viewed and interpreted the patient's EKG    Assessment/Plan:     1.  Left arm pain.  Isolated episode.  Troponins and EKG unremarkable here.  Recent stress test and echocardiogram a couple months ago were both normal.  2.  Hypertension.  Mildly elevated here.  The patient has not been checking her blood pressures at home but believes her blood pressures are normally well-controlled.  3.  Hyperlipidemia.  On statin therapy.    - Discussed options with the patient.  Explained that at this point we will pursue further cardiac workup it would likely involve coronary angiograms.  With just an isolated episode of pain I think it be reasonable to monitor her for further symptoms before pursuing further cardiac workup.  The patient is in agreement with this plan.  - I recommended the patient monitor her blood pressures at home to ensure that does not require up titration of her medications.  - She is okay for discharge home from my standpoint.  Will arrange for follow-up with myself or Addie Lennox, APRN in our office in 1 month.    Thank you for allowing me to participate in the care of Coby Roland. Feel free to contact me directly with any further questions or concerns.    Zita Quintero MD  [facility]  06/21/24  07:34 EDT

## 2024-06-21 NOTE — ED NOTES
Nursing report ED to floor  Coby Roland  83 y.o.  female    HPI :  HPI (Adult)  Stated Reason for Visit: Left shoulder/arm pain increased today.  Known that she needs a shoulder replacement.  But pain is different today.  History Obtained From: patient    Chief Complaint  Chief Complaint   Patient presents with    Arm Pain       Admitting doctor:   Anthony Arana MD    Admitting diagnosis:   The primary encounter diagnosis was Troponin level elevated. A diagnosis of Pain of left upper extremity was also pertinent to this visit.    Code status:   Current Code Status       Date Active Code Status Order ID Comments User Context       Not on file            Allergies:   Penicillins    Isolation:   No active isolations    Intake and Output  No intake or output data in the 24 hours ending 06/20/24 2229    Weight:       06/20/24  1930   Weight: 56.7 kg (125 lb)       Most recent vitals:   Vitals:    06/20/24 2030 06/20/24 2100 06/20/24 2130 06/20/24 2200   BP: 162/72 157/71 154/80 160/86   BP Location: Right arm Right arm Right arm Right arm   Patient Position: Lying Lying Lying Lying   Pulse: 83 82 84 80   Resp: 18 18 18 18   Temp:       TempSrc:       SpO2: 97% 96% 95% 98%   Weight:       Height:           Active LDAs/IV Access:   Lines, Drains & Airways       Active LDAs       Name Placement date Placement time Site Days    Peripheral IV 06/20/24 2000 Right Antecubital 06/20/24 2000  Antecubital  less than 1                    Labs (abnormal labs have a star):   Labs Reviewed   COMPREHENSIVE METABOLIC PANEL - Abnormal; Notable for the following components:       Result Value    Glucose 101 (*)     Total Protein 5.9 (*)     BUN/Creatinine Ratio 27.3 (*)     All other components within normal limits    Narrative:     GFR Normal >60  Chronic Kidney Disease <60  Kidney Failure <15    The GFR formula is only valid for adults with stable renal function between ages 18 and 70.   SINGLE HS TROPONIN T - Abnormal;  Notable for the following components:    HS Troponin T 14 (*)     All other components within normal limits    Narrative:     High Sensitive Troponin T Reference Range:  <14.0 ng/L- Negative Female for AMI  <22.0 ng/L- Negative Male for AMI  >=14 - Abnormal Female indicating possible myocardial injury.  >=22 - Abnormal Male indicating possible myocardial injury.   Clinicians would have to utilize clinical acumen, EKG, Troponin, and serial changes to determine if it is an Acute Myocardial Infarction or myocardial injury due to an underlying chronic condition.        CBC WITH AUTO DIFFERENTIAL - Abnormal; Notable for the following components:    Eosinophil % 6.9 (*)     All other components within normal limits   SINGLE HS TROPONIN T   CBC AND DIFFERENTIAL    Narrative:     The following orders were created for panel order CBC & Differential.  Procedure                               Abnormality         Status                     ---------                               -----------         ------                     CBC Auto Differential[[id number]]        Abnormal            Final result                 Please view results for these tests on the individual orders.       EKG:   ECG 12 Lead   ED Interpretation   Silke Hernandez PA-C     6/20/2024 10:20 PM   ECG 12 Lead         Date/Time: 6/20/2024 10:10 PM      Performed by: Silke Hernandez PA-C   Authorized by: Anthony Arana MD  Interpreted by ED physician   Comparison: compared with previous ECG from 9/10/2018   Similar to previous ECG   Rhythm: sinus rhythm   Rate: normal   BPM: 87   QRS axis: normal   Other findings: LAE      ECG 12 Lead Chest Pain   Preliminary Result   HEART RATE= 77  bpm   RR Interval= 779  ms   CO Interval= 164  ms   P Horizontal Axis= -21  deg   P Front Axis= 81  deg   QRSD Interval= 85  ms   QT Interval= 410  ms   QTcB= 465  ms   QRS Axis= 72  deg   T Wave Axis= 52  deg   - BORDERLINE ECG -   Sinus rhythm   Probable left atrial enlargement    Electronically Signed By:    Date and Time of Study: 2024-06-20 22:06:53      ECG 12 Lead Chest Pain   Preliminary Result   HEART RATE= 87  bpm   RR Interval= 690  ms   WI Interval= 162  ms   P Horizontal Axis= -29  deg   P Front Axis= 81  deg   QRSD Interval= 81  ms   QT Interval= 392  ms   QTcB= 472  ms   QRS Axis= 81  deg   T Wave Axis= 74  deg   - BORDERLINE ECG -   Sinus rhythm   Probable left atrial enlargement   Borderline right axis deviation   Baseline wander in lead(s) V3   Electronically Signed By:    Date and Time of Study: 2024-06-20 19:34:21          Meds given in ED:   Medications   aspirin chewable tablet 324 mg (324 mg Oral Given 6/20/24 1957)       Imaging results:  No radiology results for the last day    Ambulatory status:   - ambulatroy    Social issues:   Social History     Socioeconomic History    Marital status:    Tobacco Use    Smoking status: Never    Smokeless tobacco: Never   Substance and Sexual Activity    Alcohol use: No    Drug use: No    Sexual activity: Defer       Peripheral Neurovascular  Peripheral Neurovascular (Adult)  Peripheral Neurovascular WDL: WDL, all  Capillary Refill, LUE: less than/equal to 3 secs  Pulse Assessment: ulnar, radial  LUE Neurovascular Assessment  Temperature LUE: warm  Color LUE: no discoloration  Sensation LUE: no numbness, no tingling, tenderness present (LUE tenderness present without trauma)    Neuro Cognitive  Neuro Cognitive (Adult)  Cognitive/Neuro/Behavioral WDL: WDL    Learning  Learning Assessment (Adult)  Learning Readiness and Ability: no barriers identified  Education Provided  Person Taught: patient, spouse  Teaching Method: verbal instruction  Teaching Focus: symptom/problem overview  Education Outcome Evaluation: verbalizes understanding, needs reinforcement    Respiratory  Respiratory (Adult)  Airway WDL: WDL  Respiratory WDL  Respiratory WDL: WDL    Abdominal Pain       Pain Assessments  Pain (Adult)  (0-10) Pain Rating: Rest:  6  (0-10) Pain Rating: Activity: 6  Pain Location: shoulder  Response to Pain Interventions: other (see comments) (aspirin not given for pain)    NIH Stroke Scale   N/a    Chrystal Bland RN  06/20/24 22:29 EDT

## 2024-06-21 NOTE — PROGRESS NOTES
ED OBSERVATION PROGRESS/DISCHARGE SUMMARY    Date of Admission: 6/20/2024   LOS: 0 days   PCP: Jaimie Chacon, NATHALIA        Subjective     Hospital Outcome:   84-year-old female seen and examined at bedside following admission to the observation unit due to left upper arm and chest pain.  Laboratory evaluation showed flat serial high-sensitivity troponin and nonischemic EKG.  Venous Doppler of left upper extremity shows no evidence of DVT.  Patient had negative stress test on April 21, 2024 and had an echo at the same time that showed EF 61% with normal systolic function.    Overnight patient remained asymptomatic this morning  Denies chest pain, palpitation or shortness of breath.  Cardiology evaluated patient and has cleared her for discharge and to follow-up with cardiology outpatient.    ROS:  General: no fevers, chills  Respiratory: no cough, dyspnea  Cardiovascular: no chest pain, palpitations  Abdomen: No abdominal pain, nausea, vomiting, or diarrhea  Neurologic: No focal weakness    Objective   Physical Exam:  I have reviewed the vital signs.  Temp:  [97.9 °F (36.6 °C)-98.6 °F (37 °C)] 98.3 °F (36.8 °C)  Heart Rate:  [72-84] 74  Resp:  [16-18] 18  BP: (153-169)/(68-98) 161/98  General Appearance:    Alert, cooperative, no distress  Head:    Normocephalic, atraumatic  Eyes:    Sclerae anicteric  Neck:   Supple, no mass  Lungs: Clear to auscultation bilaterally, respirations unlabored  Heart: Regular rate and rhythm, S1 and S2 normal, no murmur, rub or gallop  Abdomen:  Soft, non-tender, bowel sounds active, nondistended  Extremities: No clubbing, cyanosis, or edema to lower extremities  Pulses:  2+ and symmetric in distal lower extremities  Skin: No rashes   Neurologic: Oriented x3, Normal strength to extremities    Results Review:    I have reviewed the labs, radiology results and diagnostic studies.    Results from last 7 days   Lab Units 06/21/24  0354   WBC 10*3/mm3 5.97   HEMOGLOBIN g/dL 12.3    HEMATOCRIT % 37.5   PLATELETS 10*3/mm3 252     Results from last 7 days   Lab Units 06/21/24  0354 06/20/24 2002   SODIUM mmol/L 141 137   POTASSIUM mmol/L 3.7 4.3   CHLORIDE mmol/L 107 103   CO2 mmol/L 25.3 26.1   BUN mg/dL 15 21   CREATININE mg/dL 0.70 0.77   CALCIUM mg/dL 8.9 9.6   BILIRUBIN mg/dL  --  0.2   ALK PHOS U/L  --  75   ALT (SGPT) U/L  --  12   AST (SGOT) U/L  --  18   GLUCOSE mg/dL 98 101*     Imaging Results (Last 24 Hours)       Procedure Component Value Units Date/Time    US Venous Doppler Upper Extremity Left (duplex) [[id number]] Collected: 06/21/24 0043     Updated: 06/21/24 0043    Narrative:        Patient: GLORIA YEE  Time Out: 00:42  Exam(s): US VENOUS BILATERAL UPPER EXTREMITIES, US VENOUS LEFT UPPER   EXTREMITY     EXAM:    US Duplex Left Upper Extremity Veins    CLINICAL HISTORY:     Reason for exam: LUE pain.    TECHNIQUE:    Real-time duplex ultrasound scan of the left upper extremity veins   integrating B-mode two-dimensional vascular structure, Doppler spectral   analysis, color flow Doppler imaging and compression.    COMPARISON:    No relevant prior studies available.    FINDINGS:    Deep veins:  Unremarkable.  No DVT in the internal jugular, subclavian,   axillary, or brachial veins.  The veins demonstrate normal color flow,   are normally compressible, with normal phasic flow and or augmentation   response.    Superficial veins:  Unremarkable.  No thrombus in the visualized   basilic and cephalic veins.    Soft tissues:  No acute findings.    IMPRESSION:         No evidence of deep venous thrombosis.    Impression:          Electronically signed by Darci Allen MD on 06-21-24 at 0042    XR Chest 1 View [[id number]] Collected: 06/20/24 2035     Updated: 06/20/24 2039    Narrative:      XR CHEST 1 VW-     Clinical: Left arm pain     COMPARISON examination 12/22/2014     FINDINGS: Cardiac size within normal limits. No mediastinal or hilar  abnormality. There is again  demonstrated eventration of the left  hemidiaphragm. No pleural effusion, pulmonary edema or acute airspace  disease has developed.     CONCLUSION: No acute cardiovascular nor pulmonary process is  demonstrated.     This report was finalized on 6/20/2024 8:36 PM by Dr. Jeffrey Helms M.D  on Workstation: [id number]               I have reviewed the medications.  ---------------------------------------------------------------------------------------------  Assessment & Plan   Assessment/Problem List    Chest pain    Troponin level elevated      Plan:  Chest pain   -Cardiac monitoring  -Vital signs every 4 hours   -Cardiology consult   -High-sensitivity troponin 14, 15, trend  -EKG-sinus rhythm, rate 87  -N.p.o. after midnight      Hypertension  -Monitor blood pressure  -Continue home blood pressure agents     Depression  -Continue home Wellbutrin, Effexor dose       Disposition: Home    Follow-up after Discharge: Cardiology    This note will serve as a discharge summary    Ryder Aguilar, NATHALIA 06/21/24 08:40 EDT    I have worn appropriate PPE during this patient encounter, sanitized my hands both with entering and exiting patient's room.      59 minutes has been spent by [facility] providers in the care of this patient while under observation status

## 2024-06-21 NOTE — PLAN OF CARE
Goal Outcome Evaluation:              Outcome Evaluation: pt was discharged from the obs unit with all belonigngs and discharge paperwork, pt to follow up with pcp as indicated, pt has no further questions at e time of discharge,  provided transport

## 2024-06-21 NOTE — FSED PROVIDER NOTE
Subjective   History of Present Illness  Patient is an 83-year-old female with a history of high blood pressure.  She presents emergency room with an episode of severe left upper arm pain that lasted about 2 hours from 4 to 6 PM.  Symptoms seem to be worse when she is moving her arm.  She had garden such during the day and the pain did not start till she was sitting at a desk doing some paperwork.  She did not have any chest pain or shortness of breath during this time.  She is just never had heart issues.  She says her only medical issue is high blood pressure.      Review of Systems   Constitutional: Negative.    HENT: Negative.     Eyes: Negative.    Respiratory: Negative.     Cardiovascular:  Negative for chest pain, palpitations and leg swelling.   Gastrointestinal: Negative.    Genitourinary: Negative.    Musculoskeletal:  Positive for myalgias (Left tricep).   Neurological: Negative.    Psychiatric/Behavioral: Negative.         Past Medical History:   Diagnosis Date    Allergic     Penicillin     Arthritis     Cataract     Depression     Hot flashes     with recent cessation of estrogen therapy    Hyperlipidemia     Hyperlipidemia     Hypertension     Migraine headache        Allergies   Allergen Reactions    Penicillins Swelling       Past Surgical History:   Procedure Laterality Date    COLONOSCOPY  2010    Dr. Michaud    COLONOSCOPY N/A 12/13/2016    Procedure: COLONOSCOPY to cecum and TI with cold polypectomy;  Surgeon: Mary Mac MD;  Location: [facility] ENDOSCOPY;  Service:     DILATION AND CURETTAGE, DIAGNOSTIC / THERAPEUTIC      HYSTERECTOMY      OTHER SURGICAL HISTORY      cataract surgery    TONSILLECTOMY         Family History   Problem Relation Age of Onset    Coronary artery disease Mother     Other Father         cerebrovascular accident (cva)    Coronary artery disease Father     Coronary artery disease Brother         1 brother    Diabetes Brother         type 2 - 2 brothers    Prostate  cancer Brother         2 brothers    Crohn's disease Brother         1 brother    Breast cancer Daughter        Social History     Socioeconomic History    Marital status:    Tobacco Use    Smoking status: Never    Smokeless tobacco: Never   Substance and Sexual Activity    Alcohol use: No    Drug use: No    Sexual activity: Defer           Objective   Physical Exam  Vitals reviewed.   Constitutional:       Appearance: Normal appearance. She is normal weight.   Eyes:      Conjunctiva/sclera: Conjunctivae normal.   Cardiovascular:      Rate and Rhythm: Normal rate and regular rhythm.      Pulses: Normal pulses.      Heart sounds: No murmur heard.  Pulmonary:      Effort: Pulmonary effort is normal.      Breath sounds: Normal breath sounds.   Abdominal:      Tenderness: There is no right CVA tenderness or left CVA tenderness.   Musculoskeletal:         General: Tenderness (Distal left tricep) present. Normal range of motion.      Right lower leg: No edema.      Left lower leg: No edema.   Skin:     General: Skin is dry.      Findings: No bruising or erythema.   Neurological:      Mental Status: She is alert.   Psychiatric:         Mood and Affect: Mood normal.         ECG 12 Lead      Date/Time: 6/20/2024 10:10 PM    Performed by: Silke Hernandez PA-C  Authorized by: Anthony Arana MD  Interpreted by ED physician  Comparison: compared with previous ECG from 9/10/2018  Similar to previous ECG  Rhythm: sinus rhythm  Rate: normal  BPM: 87  QRS axis: normal  Other findings: LAE               ED Course                                           Medical Decision Making  Presentation, patient is well-appearing.  She does have some tenderness in the arm but said it was a very severe pain that lasted about 2 hours.  I did give her aspirin as there is always concern for cardiac causes with arm pain in elderly females.  She is pain-free with the aspirin we gave her.  Troponin elevated at 14 I do feel patient needs to be  admitted for serial enzymes due to heart score.  She and  are agreeable with the admission.  I spoke with Ashlyn who accepts patient on behalf of Dr. Alcantara to the observation unit.  First EKG was reassuring.  Other labs reassuring as well as chest x-ray.  I dopplered her left arm and they did not see blood clot.  There is no redness or any suspicion of cellulitis at this time.  Second troponin and EKG have been ordered.    Problems Addressed:  Pain of left upper extremity: complicated acute illness or injury  Troponin level elevated: complicated acute illness or injury    Amount and/or Complexity of Data Reviewed  Labs: ordered.  Radiology: ordered.  ECG/medicine tests: ordered and independent interpretation performed.    Risk  OTC drugs.  Decision regarding hospitalization.        Final diagnoses:   Pain of left upper extremity   Troponin level elevated       ED Disposition  ED Disposition       ED Disposition   Decision to Admit    Condition   --    Comment   Level of Care: Observation Unit [28]   Diagnosis: Troponin level elevated [045723]   Admitting Physician: CRISTIAN ALCANTARA [[id number]]   Attending Physician: CRISTIAN ALCANTARA [[id number]]                 No follow-up provider specified.       Medication List      No changes were made to your prescriptions during this visit.

## 2024-06-21 NOTE — DISCHARGE INSTRUCTIONS
Your cardiology workup here has been negative  Return to the emergency department for symptoms recurrence  Follow-up with cardiology outpatient

## 2024-06-21 NOTE — H&P
[facility]   HISTORY AND PHYSICAL    Patient Name: Coby Roland  : 1940  MRN: [id number]  Primary Care Physician:  Jaimie Chacon APRN  Date of admission: 2024    Subjective   Subjective     Chief Complaint:   Chief Complaint   Patient presents with    Arm Pain         HPI:    Coby Roland is a 83 y.o. female, with a past medical history including, but not limited to, depression, hypertension, hyperlipidemia, migraine headaches, presented emergency department sudden onset of left arm pain.  She states she had worked out in her yard and garden all day today and began having what she describes  as severe left arm pain.  She also states that she did have some chest pain and shortness of breath with the left arm pain.  She states the left arm pain lasted approximately 2 hours and then went away on its own.  She states she does have intermittent chest pain and shortness of breath when she is working in the garden or exerting herself.  She denies any complaints at this time.  She did have a cardiac workup at an outlying facility in April and had a normal stress test at that time.  Cardiology has been consulted to see the patient this a.m.    Review of Systems   All systems were reviewed and negative except for: What was mentioned above in the HPI.    Personal History     Past Medical History:   Diagnosis Date    Allergic     Penicillin     Arthritis     Cataract     Depression     Hot flashes     with recent cessation of estrogen therapy    Hyperlipidemia     Hyperlipidemia     Hypertension     Migraine headache        Past Surgical History:   Procedure Laterality Date    COLONOSCOPY      Dr. Michaud    COLONOSCOPY N/A 2016    Procedure: COLONOSCOPY to cecum and TI with cold polypectomy;  Surgeon: Mary Mac MD;  Location: [facility] ENDOSCOPY;  Service:     DILATION AND CURETTAGE, DIAGNOSTIC / THERAPEUTIC      HYSTERECTOMY      OTHER SURGICAL HISTORY      cataract surgery     TONSILLECTOMY         Family History: family history includes Breast cancer in her daughter; Coronary artery disease in her brother, father, and mother; Crohn's disease in her brother; Diabetes in her brother; Other in her father; Prostate cancer in her brother. Otherwise pertinent FHx was reviewed and not pertinent to current issue.    Social History:  reports that she has never smoked. She has never used smokeless tobacco. She reports that she does not drink alcohol and does not use drugs.    Home Medications:  FLUoxetine, SUMAtriptan, buPROPion SR, losartan, lovastatin, and melatonin    Allergies:  Allergies   Allergen Reactions    Penicillins Swelling       Objective   Objective     Vitals:   Temp:  [98.6 °F (37 °C)] 98.6 °F (37 °C)  Heart Rate:  [81-84] 84  Resp:  [16-18] 18  BP: (154-168)/(71-80) 154/80  Physical Exam   Constitutional: Awake, alert   Eyes: PERRLA   HENT: NCAT, mucous membranes moist   Neck: Supple   Respiratory: Clear to auscultation bilaterally, nonlabored respirations    Cardiovascular: regular rate, palpable pedal pulses bilaterally   Gastrointestinal: Positive bowel sounds, soft, nontender, nondistended   Musculoskeletal: No bilateral ankle edema   Psychiatric: Appropriate affect, cooperative   Neurologic: Oriented x 3, speech clear   Skin: No rashes .    Result Review    Result Review:  I have personally reviewed the results from the time of this admission to 6/20/2024 22:08 EDT and agree with these findings:  [x]  Laboratory list / accordion  []  Microbiology  [x]  Radiology  [x]  EKG/Telemetry   []  Cardiology/Vascular   []  Pathology  [x]  Old records  []  Other:    Initial workup in the emergency department shows high-sensitivity troponin of 14, glucose 101, all other lab work is at baseline for the patient.  Chest x-ray shows no acute cardiovascular nor pulmonary process is demonstrated.  EKG shows sinus rhythm with a rate of 87.  Venous Doppler left upper extremity is pending at  this time.    Assessment & Plan   Assessment / Plan     Brief Patient Summary:  Coby Roland is a 83 y.o. female who was admitted to the observation unit for further evaluation and treatment of her left arm pain    Active Hospital Problems:  Active Hospital Problems    Diagnosis     **Chest pain     Troponin level elevated      Plan:     Chest pain   -Cardiac monitoring  -Vital signs every 4 hours   -Cardiology consult   -High-sensitivity troponin 14, 15, trend  -EKG-sinus rhythm, rate 87  -N.p.o. after midnight     Hypertension  -Monitor blood pressure  -Continue home blood pressure agents    Depression  -Continue home Wellbutrin, Effexor dose        VTE Prophylaxis:  No VTE prophylaxis order currently exists.        CODE STATUS:       Admission Status:  I believe this patient meets observation status.    76 minutes have been spent by [facility] providers in the care of this patient while under observation status.      Appropriate PPE worn during patient encounter.  Hand hygeine performed before and after seeing the patient.      Electronically signed by NATHALIA Maldonado, 06/20/24, 10:08 PM EDT.

## 2024-07-19 ENCOUNTER — OFFICE VISIT (OUTPATIENT)
Dept: CARDIOLOGY | Facility: CLINIC | Age: 84
End: 2024-07-19
Payer: MEDICARE

## 2024-07-19 DIAGNOSIS — E78.2 MIXED HYPERLIPIDEMIA: Primary | ICD-10-CM

## 2024-07-19 DIAGNOSIS — I10 PRIMARY HYPERTENSION: ICD-10-CM

## 2024-07-19 PROCEDURE — 1159F MED LIST DOCD IN RCRD: CPT | Performed by: NURSE PRACTITIONER

## 2024-07-19 PROCEDURE — 93000 ELECTROCARDIOGRAM COMPLETE: CPT | Performed by: NURSE PRACTITIONER

## 2024-07-19 PROCEDURE — 1160F RVW MEDS BY RX/DR IN RCRD: CPT | Performed by: NURSE PRACTITIONER

## 2024-07-19 PROCEDURE — 3079F DIAST BP 80-89 MM HG: CPT | Performed by: NURSE PRACTITIONER

## 2024-07-19 PROCEDURE — 3075F SYST BP GE 130 - 139MM HG: CPT | Performed by: NURSE PRACTITIONER

## 2024-07-19 PROCEDURE — 99214 OFFICE O/P EST MOD 30 MIN: CPT | Performed by: NURSE PRACTITIONER

## 2024-07-22 ENCOUNTER — TELEPHONE (OUTPATIENT)
Dept: CARDIOLOGY | Facility: CLINIC | Age: 84
End: 2024-07-22
Payer: MEDICARE

## 2024-07-22 NOTE — PROGRESS NOTES
Subjective:     Encounter Date:07/19/2024      Patient ID: Coby Roland is a 83 y.o. female.    Chief Complaint:follow up chest pain  History of Present Illness  This is an 83-year-old female who was recently seen by Dr. Quintero in the emergency room and is new to me today.  She has a past medical history of hypertension, hyperlipidemia, and depression/anxiety.  Presented to the emergency room on 6/20/2024 with complaints of left arm pain while sitting at her dining room table.  She described it as a pressure-like discomfort.  In the emergency room her workup was unremarkable with normal troponin and normal EKGs.  She had been hospitalized in April 2024 at [facility] with chest pain that occurred while she was using a tiller in her yard.  Her workup there was unremarkable as well.  She underwent a stress test and an echocardiogram both of which were normal.  Dr. Quintero discussed with the patient that further workup of her left arm discomfort would involve coronary angiograms.  It was decided to just monitor her symptoms for the time being.    She is here today for a follow-up visit.  She tells me that she has been having midsternal chest pressure that feels like someone is standing on it.  This has happened twice in the last 2 weeks.  Each time it lasted about 5 to 10 minutes.  It did occur at rest.  There were no associated symptoms with the chest discomfort.  She denies any shortness of breath, palpitations, dizziness or syncope.  No swelling in her lower extremities, orthopnea or PND.  Her blood pressure has been controlled.    I have reviewed and updated as appropriate allergies, current medications, past family history, past medical history, past surgical history and problem list.    Review of Systems   Constitutional: Negative for fever, malaise/fatigue, weight gain and weight loss.   HENT:  Negative for congestion, hoarse voice and sore throat.    Eyes:  Negative for blurred vision and double vision.    Cardiovascular:  Positive for chest pain. Negative for dyspnea on exertion, leg swelling, orthopnea, palpitations and syncope.   Respiratory:  Negative for cough, shortness of breath and wheezing.    Gastrointestinal:  Negative for abdominal pain, hematemesis, hematochezia and melena.   Genitourinary:  Negative for dysuria and hematuria.   Neurological:  Negative for dizziness, headaches, light-headedness and numbness.   Psychiatric/Behavioral:  Negative for depression. The patient is not nervous/anxious.          Current Outpatient Medications:     buPROPion SR (WELLBUTRIN SR) 100 MG 12 hr tablet, Take 1 tablet by mouth 2 (Two) Times a Day., Disp: 60 tablet, Rfl: 3    lisinopril (PRINIVIL,ZESTRIL) 10 MG tablet, Take 1 tablet by mouth Daily., Disp: , Rfl:     melatonin tablet, Take by mouth. Take as directed, Disp: , Rfl:     SUMAtriptan (IMITREX) 100 MG tablet, TAKE ONE TABLET BY MOUTH AT ONSET OF HEADACHE; MAY REPEAT ONE TABLET IN 2 HOURS AS NEEDED., Disp: 9 tablet, Rfl: 2    venlafaxine XR (EFFEXOR-XR) 150 MG 24 hr capsule, Take 1 capsule by mouth Daily., Disp: , Rfl:     Past Medical History:   Diagnosis Date    Allergic     Penicillin     Arthritis     Cataract     Depression     Hot flashes     with recent cessation of estrogen therapy    Hyperlipidemia     Hyperlipidemia     Hypertension     Migraine headache        Past Surgical History:   Procedure Laterality Date    COLONOSCOPY  2010    Dr. Michaud    COLONOSCOPY N/A 12/13/2016    Procedure: COLONOSCOPY to cecum and TI with cold polypectomy;  Surgeon: Mary Mac MD;  Location: [facility] ENDOSCOPY;  Service:     DILATION AND CURETTAGE, DIAGNOSTIC / THERAPEUTIC      HYSTERECTOMY      OTHER SURGICAL HISTORY      cataract surgery    TONSILLECTOMY         Family History   Problem Relation Age of Onset    Coronary artery disease Mother     Other Father         cerebrovascular accident (cva)    Coronary artery disease Father     Coronary artery disease  "Brother         1 brother    Diabetes Brother         type 2 - 2 brothers    Prostate cancer Brother         2 brothers    Crohn's disease Brother         1 brother    Breast cancer Daughter        Social History     Tobacco Use    Smoking status: Never    Smokeless tobacco: Never   Vaping Use    Vaping status: Never Used   Substance Use Topics    Alcohol use: No    Drug use: No         ECG 12 Lead    Date/Time: 7/23/2024 9:24 AM  Performed by: Ashley Posada APRN    Authorized by: Ashley Posada APRN  Comparison: compared with previous ECG from 6/21/2024  Similar to previous ECG  Rhythm: sinus rhythm             Objective:     Visit Vitals  /80   Pulse 85   Ht 154.9 cm (61\")   Wt 59 kg (130 lb)   LMP  (LMP Unknown)   BMI 24.56 kg/m²             Physical Exam  Constitutional:       Appearance: Normal appearance. She is normal weight.   HENT:      Head: Normocephalic.   Neck:      Vascular: No carotid bruit.   Cardiovascular:      Rate and Rhythm: Normal rate and regular rhythm.      Chest Wall: PMI is not displaced.      Pulses: Normal pulses.           Radial pulses are 2+ on the right side and 2+ on the left side.        Posterior tibial pulses are 2+ on the right side and 2+ on the left side.      Heart sounds: Normal heart sounds. No murmur heard.     No friction rub. No gallop.   Pulmonary:      Effort: Pulmonary effort is normal.      Breath sounds: Normal breath sounds.   Abdominal:      General: Bowel sounds are normal. There is no distension.      Palpations: Abdomen is soft.   Musculoskeletal:      Right lower leg: No edema.      Left lower leg: No edema.   Skin:     General: Skin is warm and dry.      Capillary Refill: Capillary refill takes less than 2 seconds.   Neurological:      Mental Status: She is alert and oriented to person, place, and time.   Psychiatric:         Mood and Affect: Mood normal.         Behavior: Behavior normal.         Thought Content: Thought content normal.          Lab " Review:         Cardiac Procedures:       Assessment:         Diagnoses and all orders for this visit:    1. Mixed hyperlipidemia (Primary)    2. Primary hypertension    Other orders  -     ECG 12 Lead            Plan:       Chest pain: normal stress test and echocardiogram in April 2024. Recent workup in ED in June 2024 showed normal troponins and EKG. We discussed pursuing cardiac catheterization vs continuing to monitor symptoms. She would like to avoid any invasive procedures at this time. She would like something for episodes of chest pain. Given her history of migraines, I'm hesitant to prescribe a long acting nitrate. Will write for sublingual nitro.  HTN: blood pressure controlled. No changes  HLD: managed by PCP    Thank you for allowing me to participate in this patient's care. Please call with any questions or concerns. Ms. Roland will follow up with Dr. Quintero in 3 months          Your medication list            Accurate as of July 19, 2024 11:59 PM. If you have any questions, ask your nurse or doctor.                CONTINUE taking these medications        Instructions Last Dose Given Next Dose Due   buPROPion  MG 12 hr tablet  Commonly known as: WELLBUTRIN SR      Take 1 tablet by mouth 2 (Two) Times a Day.       lisinopril 10 MG tablet  Commonly known as: PRINIVIL,ZESTRIL      Take 1 tablet by mouth Daily.       melatonin 3 MG tablet      Take by mouth. Take as directed       SUMAtriptan 100 MG tablet  Commonly known as: IMITREX      TAKE ONE TABLET BY MOUTH AT ONSET OF HEADACHE; MAY REPEAT ONE TABLET IN 2 HOURS AS NEEDED.       venlafaxine  MG 24 hr capsule  Commonly known as: EFFEXOR-XR      Take 1 capsule by mouth Daily.                  NATHALIA Luque  07/23/24  9:15 AM EDT

## 2024-07-23 VITALS
SYSTOLIC BLOOD PRESSURE: 138 MMHG | WEIGHT: 130 LBS | HEIGHT: 61 IN | DIASTOLIC BLOOD PRESSURE: 80 MMHG | BODY MASS INDEX: 24.55 KG/M2 | HEART RATE: 85 BPM